# Patient Record
Sex: MALE | Race: WHITE | Employment: OTHER | URBAN - METROPOLITAN AREA
[De-identification: names, ages, dates, MRNs, and addresses within clinical notes are randomized per-mention and may not be internally consistent; named-entity substitution may affect disease eponyms.]

---

## 2021-05-10 ENCOUNTER — HOSPITAL ENCOUNTER (OUTPATIENT)
Age: 71
Setting detail: OUTPATIENT SURGERY
Discharge: HOME | End: 2021-05-10
Attending: INTERNAL MEDICINE | Admitting: INTERNAL MEDICINE
Payer: MEDICARE

## 2021-05-10 VITALS
OXYGEN SATURATION: 95 % | SYSTOLIC BLOOD PRESSURE: 134 MMHG | HEART RATE: 80 BPM | TEMPERATURE: 98.3 F | DIASTOLIC BLOOD PRESSURE: 81 MMHG | HEIGHT: 71 IN | RESPIRATION RATE: 15 BRPM | WEIGHT: 203 LBS | BODY MASS INDEX: 28.42 KG/M2

## 2021-05-10 DIAGNOSIS — I35.0 AORTIC VALVE STENOSIS, ETIOLOGY OF CARDIAC VALVE DISEASE UNSPECIFIED: ICD-10-CM

## 2021-05-10 PROCEDURE — C1751 CATH, INF, PER/CENT/MIDLINE: HCPCS | Performed by: INTERNAL MEDICINE

## 2021-05-10 PROCEDURE — C1887 CATHETER, GUIDING: HCPCS | Performed by: INTERNAL MEDICINE

## 2021-05-10 PROCEDURE — 93453 R&L HRT CATH W/VENTRICLGRPHY: CPT | Performed by: INTERNAL MEDICINE

## 2021-05-10 PROCEDURE — 99153 MOD SED SAME PHYS/QHP EA: CPT | Performed by: INTERNAL MEDICINE

## 2021-05-10 PROCEDURE — 77030019569 HC BND COMPR RAD TERU -B: Performed by: INTERNAL MEDICINE

## 2021-05-10 PROCEDURE — 77030012468 HC VLV BLEEDBK CNTRL ABBT -B: Performed by: INTERNAL MEDICINE

## 2021-05-10 PROCEDURE — 77030013744: Performed by: INTERNAL MEDICINE

## 2021-05-10 PROCEDURE — C1769 GUIDE WIRE: HCPCS | Performed by: INTERNAL MEDICINE

## 2021-05-10 PROCEDURE — 99152 MOD SED SAME PHYS/QHP 5/>YRS: CPT | Performed by: INTERNAL MEDICINE

## 2021-05-10 PROCEDURE — 74011250636 HC RX REV CODE- 250/636: Performed by: INTERNAL MEDICINE

## 2021-05-10 PROCEDURE — 74011000250 HC RX REV CODE- 250: Performed by: INTERNAL MEDICINE

## 2021-05-10 PROCEDURE — 74011000636 HC RX REV CODE- 636: Performed by: INTERNAL MEDICINE

## 2021-05-10 PROCEDURE — 77030008543 HC TBNG MON PRSS MRTM -A: Performed by: INTERNAL MEDICINE

## 2021-05-10 PROCEDURE — 77030004532 HC CATH ANGI DX IMP BSC -A: Performed by: INTERNAL MEDICINE

## 2021-05-10 PROCEDURE — 77030013797 HC KT TRNSDUC PRSSR EDWD -A: Performed by: INTERNAL MEDICINE

## 2021-05-10 RX ORDER — FENOFIBRIC ACID 135 MG/1
135 CAPSULE, DELAYED RELEASE ORAL DAILY
COMMUNITY

## 2021-05-10 RX ORDER — MIDAZOLAM HYDROCHLORIDE 1 MG/ML
INJECTION, SOLUTION INTRAMUSCULAR; INTRAVENOUS AS NEEDED
Status: DISCONTINUED | OUTPATIENT
Start: 2021-05-10 | End: 2021-05-10 | Stop reason: HOSPADM

## 2021-05-10 RX ORDER — HEPARIN SODIUM 1000 [USP'U]/ML
INJECTION, SOLUTION INTRAVENOUS; SUBCUTANEOUS AS NEEDED
Status: DISCONTINUED | OUTPATIENT
Start: 2021-05-10 | End: 2021-05-10 | Stop reason: HOSPADM

## 2021-05-10 RX ORDER — TADALAFIL 2.5 MG/1
2.5 TABLET ORAL DAILY
COMMUNITY

## 2021-05-10 RX ORDER — SODIUM CHLORIDE 9 MG/ML
3 INJECTION, SOLUTION INTRAVENOUS CONTINUOUS
Status: DISPENSED | OUTPATIENT
Start: 2021-05-10 | End: 2021-05-10

## 2021-05-10 RX ORDER — FENTANYL CITRATE 50 UG/ML
INJECTION, SOLUTION INTRAMUSCULAR; INTRAVENOUS AS NEEDED
Status: DISCONTINUED | OUTPATIENT
Start: 2021-05-10 | End: 2021-05-10 | Stop reason: HOSPADM

## 2021-05-10 RX ORDER — SODIUM CHLORIDE 9 MG/ML
1.5 INJECTION, SOLUTION INTRAVENOUS CONTINUOUS
Status: DISPENSED | OUTPATIENT
Start: 2021-05-10 | End: 2021-05-10

## 2021-05-10 RX ORDER — SIMVASTATIN 40 MG/1
40 TABLET, FILM COATED ORAL
COMMUNITY

## 2021-05-10 RX ORDER — VERAPAMIL HYDROCHLORIDE 2.5 MG/ML
INJECTION, SOLUTION INTRAVENOUS AS NEEDED
Status: DISCONTINUED | OUTPATIENT
Start: 2021-05-10 | End: 2021-05-10 | Stop reason: HOSPADM

## 2021-05-10 RX ORDER — ASPIRIN 81 MG/1
81 TABLET ORAL DAILY
Qty: 90 TAB | Refills: 4 | Status: SHIPPED | OUTPATIENT
Start: 2021-05-10

## 2021-05-10 RX ORDER — TESTOSTERONE 10 MG/G
2 GEL TOPICAL DAILY
COMMUNITY

## 2021-05-10 RX ORDER — FLUOXETINE 20 MG/1
20 TABLET ORAL DAILY
COMMUNITY

## 2021-05-10 RX ORDER — MIRTAZAPINE 45 MG/1
45 TABLET, FILM COATED ORAL
COMMUNITY

## 2021-05-10 RX ORDER — LIDOCAINE HYDROCHLORIDE 10 MG/ML
INJECTION INFILTRATION; PERINEURAL AS NEEDED
Status: DISCONTINUED | OUTPATIENT
Start: 2021-05-10 | End: 2021-05-10 | Stop reason: HOSPADM

## 2021-05-10 RX ORDER — TAMSULOSIN HYDROCHLORIDE 0.4 MG/1
0.4 CAPSULE ORAL DAILY
COMMUNITY
End: 2021-07-15

## 2021-05-10 RX ORDER — HYDRALAZINE HYDROCHLORIDE 20 MG/ML
INJECTION INTRAMUSCULAR; INTRAVENOUS AS NEEDED
Status: DISCONTINUED | OUTPATIENT
Start: 2021-05-10 | End: 2021-05-10 | Stop reason: HOSPADM

## 2021-05-10 RX ADMIN — SODIUM CHLORIDE 3 ML/KG/HR: 9 INJECTION, SOLUTION INTRAVENOUS at 11:14

## 2021-05-10 NOTE — DISCHARGE INSTRUCTIONS
**Your aortic valve is severely narrowed (severe aortic stenosis). We will evaluate you for TAVR (Transcatheter Aortic Valve Replacement). **You only have mild/moderate blockages in your heart arteries that can be treated with medications. Stents or bypass surgery are not necessary. **Start taking aspirin 81 mg daily -- this will help prevent further heart artery blockages from forming. **Dr. Cyndy Dupont office will contact you to schedule the following:  - TAVR CT scan  - Carotid ultrasound  - Visit with a cardiothoracic surgeon, Dr. Familia Masterson  Current Discharge Medication List      START taking these medications    Details   aspirin delayed-release 81 mg tablet Take 1 Tab by mouth daily. Qty: 90 Tab, Refills: 4  Start date: 5/10/2021         CONTINUE these medications which have NOT CHANGED    Details   tadalafiL (Cialis) 2.5 mg tablet Take 2.5 mg by mouth daily. fenofibric acid (TRILIPIX ER) 135 mg capsule Take 135 mg by mouth daily. FLUoxetine (PROzac) 20 mg tablet Take 20 mg by mouth daily. mirtazapine (REMERON) 45 mg tablet Take 45 mg by mouth nightly. simvastatin (ZOCOR) 40 mg tablet Take 40 mg by mouth nightly. tamsulosin (FLOMAX) 0.4 mg capsule Take 0.4 mg by mouth daily. testosterone 12.5 mg/ 1.25 gram (1 %) glpm 2 Pump(s) by TransDERmal route daily. After Your Cardiac Catheterization    Cardiac catheterization (also called cardiac cath) is an invasive imaging procedure that allows your doctor to look at your coronary arteries to diagnose coronary artery disease. It can also be used to measure pressures in your chambers, and evaluate the function of your heart. Instructions for going home after Cardiac Catheterization    Care for the Catheter Insertion Site  Procedures may be performed in the femoral artery in the groin (in the area at the top of your thigh) or in the radial artery in your arm.  When you go home, there will be a bandage (dressing) over the catheter insertion site (also called the wound site).  The morning after your procedure, you may take the dressing off. The easiest way to do this is when you are showering, get the tape and dressing wet and remove it.  After the bandage is removed, cover the area with a small adhesive bandage. It is normal for the catheter insertion site to be black and blue for a couple of days. The site may also be slightly swollen and pink, and there may be a small lump (about the size of a quarter) at the site.  Wash the catheter insertion site at least once daily with soap and water. Place soapy water on your hand or washcloth and gently wash the insertion site; do not rub.  Keep the area clean and dry when you are not showering.  Do not use powders, creams, lotions or ointment on the wound site.  Wear loose clothes and loose underwear.  Do not take a bath, tub soak, go in a Jacuzzi, or swim in a pool or lake for one week after the procedure.  You may notice a small lump at the site. This is normal and may last up to 6 weeks. CHECK THE CATHETER INSERTION SITE DAILY:    If bleeding at the cath site occurs, take a clean washcloth and apply direct pressure just above the puncture site for at least 15 minutes. Call 911 immediately if the bleeding is not controlled. Continue to apply direct pressure until an ambulance gets to your location. Do not try to drive yourself or have someone else drive you to the hospital.  Have the ambulance bring you to the emergency room. Activity Guidelines  Your doctor will tell you when you can resume activities. In general, you will need to take it easy for the first two days after you get home. You can expect to feel tired and weak the day after the procedure. Take walks around your house and plan to rest during the day.     For femoral cardiac cath   Do not strain during bowel movements for the first 3 to 4 days after the procedure to prevent bleeding from the catheter insertion site.  Avoid heavy lifting (more than 10 pounds) and pushing or pulling heavy objects for the first 5 to 7 days after the procedure.  Do not participate in strenuous activities for 5 days after the procedure. This includes most sports - jogging, golfing, play tennis, and bowling.  You may climb stairs if needed, but walk up and down the stairs more slowly than usual.    Gradually increase your activities until you reach your normal activity level within one week after the procedure. For radial cardiac cath   Do not participate in strenuous activities for 2 days after the procedure. This includes most sports - jogging, golfing, play tennis, and bowling.  Gradually increase your activities until you reach your normal activity level within two days after the procedure. Ask your doctor when it is safe to   Return to work.  Resume sexual activity.  Resume driving. Most people are able to resume driving within 24 hours after going home. Medications   Please review your medications with your doctor before you go home. Ask your doctor if you should continue taking the medications you were taking before the procedure.  If you have diabetes, your doctor may adjust your diabetes medications for one to two days after your procedure. Please be sure to ask for specific directions about taking your diabetes medication after the procedure.  Depending on the results of your procedure, your doctor may prescribe new medication. Please make sure you understand what medications you should be taking after the procedure and how often to take them.  You may use Tylenol 325mg 1-2 tablets every 6 hours as needed for pain or discomfort, unless otherwise instructed. If you have significant         discomfort more than 48 hours after your procedure, please call your physicians office.    If you take METFORMIN, do NOT take this for the next 2 days after your procedure. Fluid Guidelines  Be sure to drink eight to ten glasses of clear fluids (water is preferred) for the 24 hours to flush the contrast material from your system. Importance of a Heart-Healthy Lifestyle  It is important for you to be committed to leading a heart-healthy lifestyle. Your health care team can help you achieve your goals, but it is up to you to take your medications as prescribed, make dietary changes, quit smoking, exercise regularly, keep your follow-up appointments and be an active member of the treatment team.    Follow Up  Please call your cardiologist to schedule a follow-up appointment in the next 1-2 weeks if possible. Ask your primary care doctor when you should return for follow-up. Please ask your doctor if you have any questions about cardiac catheterization, angioplasty or stenting. If possible, have someone stay with you for the first night. It is normal to feel tired for the first couple of days. Take it easy and follow your physicians instructions on activity. CALL THE PHYSICIAN:  ? If the site becomes red, swollen, or feels warm to the touch, or is healing poorly    ? If you note any large/extending bruise, fever >101.0 or chills  ? If your extremity has numbness, tingling, discoloration, abnormal swelling, tightness or pain   ? If you have difficulty with urination or develop nausea, vomiting, or severe abdominal pain    SIGNS AND SYMPTOMS:  ? Notify your physician for new or recurrent symptoms of chest discomfort, unusual shortness of breath or fatigue. These could be signs of a problem and should be discussed with your physician. ? For significant chest pain or symptoms of angina not relieved with rest:  if you have been prescribed Nitroglycerin, take as directed (taken under the tongue, one at a time 5 minutes apart for a total of 3 doses, sitting down). If the discomfort is not relieved after the 3rd Nitroglycerin, call 911.   If you have not been prescribed Nitroglycerin and your chest discomfort is significant, call 911. Take the ambulance, do not try to drive yourself or have someone else drive you to the hospital.     AFTER CARE:  ? Follow up with your physician as instructed  ? Follow a heart healthy diet with proper portion control, daily stress management, daily exercise, blood pressure and cholesterol control, and smoking cessation. The success of your stent, if you had one placed, and the prevention of future catheterizations heavily depends on your lifestyle changes you make now! ? You may start walking short distances the day of your procedure. Gradually increase as tolerated each day. Current activity recommendations are 30 minutes of exercise at least 5 days a week. Work up to this as you recover. Walk, ride a bike, or choose any other activity you enjoy to reach this activity goal.   ? Avoid walking outside in extremes of heat or cold. Walk inside (at home, at the mall, or at a large store) when it is cold and windy or hot and humid. ? If you had a stent placed, consider [facility] as a resource to help you make the needed lifestyle changes to live a heart healthy lifestyle. Discuss your candidacy with your physician. If you have questions, call your physicians office or the Cardiac Cath Lab at [phone]. The Cath Lab is operational from 6:30 a.m. to 5:00 p.m., Monday through Friday. After hours, notify your physician. [address] [place] [address] can be reached at [phone]. [facility] is operational Monday-Thursday 8:30 a.m. to 5:00 p.m. and Friday 8:30 a.m. to 12:00 p.m.     Remember:  IN CASE OF BLEEDING: KEEP FIRM PRESSURE ON THE PROCEDURE SITE AND CALL 911 IF NOT CONTROLLED

## 2021-05-10 NOTE — PROGRESS NOTES
1623:  I have reviewed discharge instructions with the patient. The patient verbalized understanding. 1709: Manoj Guajardo ambulated @ 2876 (time) approximately 20 feet. Patient tolerated ambulation without adverse advents. left radial (right/left, groin/arm)  without bleeding or hematoma noted. 1730:  Patient wheeled out via wheelchair for discharge to the patient's wife.

## 2021-05-10 NOTE — H&P
Outpatient Cath Lab History and Physical     5/10/2021  Patient: Guero Gibbs 79 y.o. male   Chief Complaint: []   Angina   [x]   Dyspnea   []       History of Present Illness: (for details see office notes)  78 yo M with borderline severe AS with progressive GALINDO. Nearly severe by Vmax and MG, but moderate/severe by CAROL. TTE 3/3/21:  EF 55-60%, grade 1 DD, CAROL 1.21 cm2, AV MG 38 mmHg, Vmax 3.9 m/s, DI 0.27, LVOT/AV VTI 22.1/782.4 cm, LVOTd 2.4 cm. Due to progression in GALINDO, now NYHA class II, here for invasive aortic valve study to better assess the aortic valve. Occasional LH, no syncope. No CP. History:(for details see office notes)  Aortic stenosis  HTN  PAD  HL  DM2    Review of Systems  Except as noted in HPI, patient denies recent fever or chills, nausea, vomiting, diarrhea, hemoptysis, hematemesis, dysuria, myalgias, focal neurologic symptoms, ecchymosis, angioedema, odynophagia, dysphagia, sore throat, earache,rash, melena, hematochezia, depression, GERD, cold intolerance, petechia, bleeding gums, or significant weight loss. A comprehensive review of systems was negative except for that written in the HPI. No family history on file. (see office notes for details)  Social History     Tobacco Use    Smoking status: Not on file   Substance Use Topics    Alcohol use: Not on file   (see office notes for details)    Allergies: Allergies   Allergen Reactions    Ceftin [Cefuroxime Axetil] Hives     Hives and vomiting    Ciprofloxacin Other (comments)     Light headedness       Prior to Admission Medications (details in office records):  Prior to Admission medications    Medication Sig Start Date End Date Taking? Authorizing Provider   tadalafiL (Cialis) 2.5 mg tablet Take 2.5 mg by mouth daily. Yes Provider, Historical   fenofibric acid (TRILIPIX ER) 135 mg capsule Take 135 mg by mouth daily. Yes Provider, Historical   FLUoxetine (PROzac) 20 mg tablet Take 20 mg by mouth daily. Yes Provider, Historical   mirtazapine (REMERON) 45 mg tablet Take 45 mg by mouth nightly. Yes Provider, Historical   simvastatin (ZOCOR) 40 mg tablet Take 40 mg by mouth nightly. Yes Provider, Historical   tamsulosin (FLOMAX) 0.4 mg capsule Take 0.4 mg by mouth daily. Yes Provider, Historical   testosterone 12.5 mg/ 1.25 gram (1 %) glpm 2 Pump(s) by TransDERmal route daily. Yes Provider, Historical         Physical Exam:  Overall VSSAF;    Visit Vitals  BP (!) 152/91 (BP 1 Location: Left arm, BP Patient Position: At rest)   Pulse 97   Temp 98.3 °F (36.8 °C)   Resp 12   Ht 5' 11\" (1.803 m)   Wt 92.1 kg (203 lb)   SpO2 96%   BMI 28.31 kg/m²     Temp (24hrs), Av.3 °F (36.8 °C), Min:98.3 °F (36.8 °C), Max:98.3 °F (36.8 °C)      Physical Exam  GEN: NAD, appears stated age  HEENT: EOMI, MMM, OP clear  NECK: Normal JVP  CV: RRR, normal S1, soft S2, harsh III/VI late-peaking systolic murmur at LUSB, no rubs or gallops  LUNGS: CTAB, no W/R/R  ABD: NABS, soft, NT/ND  EXT: No edema, 2+ and symmetrical radial pulses and pedal pulses b/l  PSYCH: Mood and affect normal  NEURO: AAO, MAEW, face symmetrical, speech intact    Labs: per outpatient records  CXR: per outpatient records    Plan of Care/Planned Procedure:  Risks, benefits, and alternatives reviewed with patient and he agrees to proceed with the procedure. For other plans, see orders. L/RHC/coronaries/AV study in setting of progressive GALINDO c/f symptomatic, severe aortic stenosis. Attila Reyes, [address] [place] [address] / [address] [place] [address] [facility]  05/10/21

## 2021-05-10 NOTE — PROGRESS NOTES
Cardiac Cath Lab Procedure Area Arrival Note:    Vazquez Edmondson arrived to Cardiac Cath Lab, Procedure Area. Patient identifiers verified with NAME and DATE OF BIRTH. Procedure verified with patient. Consent forms verified. Allergies verified. Patient informed of procedure and plan of care. Questions answered with review. Patient voiced understanding of procedure and plan of care. Patient on cardiac monitor, non-invasive blood pressure, SPO2 monitor. On room air. IV of NS on pump at 138 ml/hr. Patient status doing well without problems. Patient is A&Ox 4. Patient reports no chest pain or shortness of breath. Patient medicated during procedure with orders obtained and verified by Dr. Mirtha Acevedo. Refer to patients Cardiac Cath Lab PROCEDURE REPORT for vital signs, assessment, status, and response during procedure, printed at end of case. Printed report on chart or scanned into chart.

## 2021-05-10 NOTE — PROGRESS NOTES
Transfer to [address] [place] [address] from Procedure Area    Verbal report given to Kati Quiroz on Zaira Wagner being transferred to Cardiac Cath Lab  for routine progression of care   Patient is post left and right heart cath procedure. Patient stable upon transfer to . Report consisted of patients Situation, Background, Assessment and   Recommendations(SBAR). Information from the following report(s) SBAR, Procedure Summary and MAR was reviewed with the receiving nurse. Opportunity for questions and clarification was provided. Patient medicated during procedure with orders obtained and verified by Dr. Muñoz. Refer to patient PROCEDURE REPORT for vital signs, assessment, status, and response during procedure.

## 2021-05-10 NOTE — ROUTINE PROCESS
Cardiac Cath Lab Recovery Arrival Note:      Macarena Montalvo arrived to Cardiac Cath Lab, Recovery Area. Staff introduced to patient. Patient identifiers verified with NAME and DATE OF BIRTH. Procedure verified with patient. Consent forms reviewed and signed by patient or authorized representative and verified. Allergies verified. Patient informed of procedure and plan of care. Questions answered with review. Patient prepped for procedure, per orders from physician, prior to arrival.    Patient on cardiac monitor, non-invasive blood pressure, SPO2 monitor. Patient is A&Ox 4. Patient reports no complaints. Patient in stretcher, in low position, with side rails up, call bell within reach, patient instructed to call of assistance as needed. Patient prep in: [facility] Recovery Area, Bed# 7. Family in: waiting room. Prep by: OTSHA Shannon

## 2021-05-10 NOTE — Clinical Note
Multiple views of the left main, left coronary artery and circumflex artery obtained using power injection.

## 2021-05-10 NOTE — PROGRESS NOTES
TRANSFER - IN REPORT:    Verbal report received from [place] on Katrin Kathleen , from the Cardiac Cath lab, for routine progression of care. Report consisted of patients Situation, Background, Assessment and Recommendations(SBAR). Information from the following report(s) Procedure Summary, Intake/Output, MAR and Recent Results was reviewed with the receiving clinician. Opportunity for questions and clarification was provided. Assessment completed upon patients arrival to [address] [place] [address] and care assumed. Cardiac Cath Lab Recovery Arrival Note:     Katrin Kathleen arrived to [facility] recovery area. Patient procedure= R/LHC. Patient on cardiac monitor, non-invasive blood pressure, Patient status doing well without problems. Patient is A&Ox 4. Patient reports no complaints. Procedure site without any bleeding and no hematoma.

## 2021-06-23 ENCOUNTER — OFFICE VISIT (OUTPATIENT)
Dept: CARDIOLOGY CLINIC | Age: 71
End: 2021-06-23
Payer: MEDICARE

## 2021-06-23 VITALS
OXYGEN SATURATION: 94 % | HEART RATE: 66 BPM | RESPIRATION RATE: 16 BRPM | DIASTOLIC BLOOD PRESSURE: 72 MMHG | SYSTOLIC BLOOD PRESSURE: 140 MMHG

## 2021-06-23 DIAGNOSIS — I35.0 NONRHEUMATIC AORTIC VALVE STENOSIS: Primary | ICD-10-CM

## 2021-06-23 PROBLEM — E11.59 CONTROLLED TYPE 2 DIABETES MELLITUS WITH CIRCULATORY DISORDER, WITHOUT LONG-TERM CURRENT USE OF INSULIN (HCC): Status: ACTIVE | Noted: 2021-06-23

## 2021-06-23 PROBLEM — N40.0 BPH (BENIGN PROSTATIC HYPERPLASIA): Status: ACTIVE | Noted: 2021-06-23

## 2021-06-23 PROBLEM — F32.A DEPRESSION: Status: ACTIVE | Noted: 2021-06-23

## 2021-06-23 PROBLEM — N18.30 STAGE 3 CHRONIC KIDNEY DISEASE (HCC): Status: ACTIVE | Noted: 2021-06-23

## 2021-06-23 PROBLEM — E78.5 DYSLIPIDEMIA: Status: ACTIVE | Noted: 2021-06-23

## 2021-06-23 PROCEDURE — G8432 DEP SCR NOT DOC, RNG: HCPCS | Performed by: THORACIC SURGERY (CARDIOTHORACIC VASCULAR SURGERY)

## 2021-06-23 PROCEDURE — G8419 CALC BMI OUT NRM PARAM NOF/U: HCPCS | Performed by: THORACIC SURGERY (CARDIOTHORACIC VASCULAR SURGERY)

## 2021-06-23 PROCEDURE — 1101F PT FALLS ASSESS-DOCD LE1/YR: CPT | Performed by: THORACIC SURGERY (CARDIOTHORACIC VASCULAR SURGERY)

## 2021-06-23 PROCEDURE — G8427 DOCREV CUR MEDS BY ELIG CLIN: HCPCS | Performed by: THORACIC SURGERY (CARDIOTHORACIC VASCULAR SURGERY)

## 2021-06-23 PROCEDURE — 99205 OFFICE O/P NEW HI 60 MIN: CPT | Performed by: THORACIC SURGERY (CARDIOTHORACIC VASCULAR SURGERY)

## 2021-06-23 PROCEDURE — 3017F COLORECTAL CA SCREEN DOC REV: CPT | Performed by: THORACIC SURGERY (CARDIOTHORACIC VASCULAR SURGERY)

## 2021-06-23 PROCEDURE — G8536 NO DOC ELDER MAL SCRN: HCPCS | Performed by: THORACIC SURGERY (CARDIOTHORACIC VASCULAR SURGERY)

## 2021-06-23 RX ORDER — ALFUZOSIN HYDROCHLORIDE 10 MG/1
10 TABLET, EXTENDED RELEASE ORAL DAILY
COMMUNITY
Start: 2021-07-14 | End: 2021-07-16

## 2021-06-23 NOTE — PROGRESS NOTES
Patient: Rosa Moore   Age: 79 y.o. Patient Care Team:  Lydia Lawrence MD as PCP - General (Family Medicine)  Govind West MD (Cardiology)  Antoinette Winston MD (Cardiothoracic Surgery)    PCP: Lydia Lawrence MD    Cardiologist: Dr. Ty Hung    Diagnosis/Reason for Consultation: The encounter diagnosis was Nonrheumatic aortic valve stenosis. 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 male with PMHx of AS, DM, Dyslipidemia, CKD III, Depression, that is referred to the [address] [place] [address] by Dr. Ty Hung for interventional evaluation of  Aortic Stenosis. Really started experiencing symptoms about a year ago. He tried to run on [place] (he has been able to do this for short distances) and could not do it. He was significantly winded and fatigued. He has had multiple knee and shoulder surgeries which do impact his activities some but he is still able to complete his 3 mile walk daily. He is able go up about 20 stairs before getting winded. Occasionally notes palpitations. No real dizziness, no syncope or recent falls. Denies orthopnea, PND, LE edema, GIB, or HF hospitalizations in the last year. His Flomax was recently changed to Alfuzosin. Chest tightness which radiates to his left arm. Symptoms occur with activity and rest. Pain can last a couple of hours. He rates this between a 6-9. Associated diaphoresis-mostly at night. Denies associated N/V, dizziness, syncope. Has gained about 5 lbs recently.     SOB/GALINDO: Yes   Fatigue: Yes   Palpitations: Yes   Chest pain: Yes   Chest tightness with activity: Yes   Lightheadedness: No:    Syncope: No:    Falls: No:    Orthopnea: No:    PND: No:    LE edema: No:    Medication changes in past 3 months: Yes (Flomax changed to Alfuzosin)  Blood/Blackness/Tarriness of stools: No:    Heart Failure Admission w/in past year:  No:    Heart Failure Admission w/in past 2 weeks: No:     NYHA Classification: IIB   Class I (Mild): No limitation of physical activity. Ordinary physical activity does not cause undue fatigue, palpitation, or dyspnea. Class II (Mild): Slight limitation of physical activity. Comfortable at rest, but ordinary physical activity results in fatigue, palpitation, or dyspnea. Class III (Moderate): Marked limitation of physical activity. Comfortable at rest, but less than ordinary activity causes fatigue, palpitation, or dyspnea   Class IV (Severe): Unable to carry out any physical activity without discomfort. Symptoms  of cardiac insufficiency at rest.  If any physical activity is undertaken, discomfort is increased. Angina Classification: 2   Class 0: No symptoms   Class 1: Angina with strenuous exercise   Class 2: Angina with moderate exercise   Class 3: Angina with mild exertion   Walking 1-2 level blocks at normal pace; Climbing 1 flight of stairs at normal pace  Class 4: Angina at any level of physical exertion       Past Medical History:   Diagnosis Date    Aortic stenosis     Diabetes ([address]     Diastolic heart failure (HCC)     Dyslipidemia     PVD (peripheral vascular disease) (HCC)      Endocarditis: No:    Pulmonary HTN:  No:  Greater than 2/3 systemic: No:   Immunocompromised/Steroids: No:   Liver Dz/Cirrhosis: No:    If yes, MELD score:  n/a  Last Dental Visit:  8 months ago   Any dental pain/concerns: None    Past Surgical History:   Procedure Laterality Date    HX KNEE ARTHROSCOPY      HX SHOULDER ARTHROSCOPY        Social History     Tobacco Use    Smoking status: Former Smoker    Smokeless tobacco: Never Used   Substance Use Topics    Alcohol use:  Yes     Alcohol/week: 1.0 standard drinks     Types: 1 Standard drinks or equivalent per week      Family History   Problem Relation Age of Onset    Dementia Mother     Alzheimer Father     Heart Attack Brother      Prior to Admission medications    Medication Sig Start Date End Date Taking? Authorizing Provider   alfuzosin SR (UROXATRAL) 10 mg SR tablet Take 10 mg by mouth daily. Yes Provider, Historical   tadalafiL (Cialis) 2.5 mg tablet Take 2.5 mg by mouth daily. Yes Provider, Historical   fenofibric acid (TRILIPIX ER) 135 mg capsule Take 135 mg by mouth daily. Yes Provider, Historical   FLUoxetine (PROzac) 20 mg tablet Take 20 mg by mouth daily. Yes Provider, Historical   mirtazapine (REMERON) 45 mg tablet Take 45 mg by mouth nightly. Yes Provider, Historical   simvastatin (ZOCOR) 40 mg tablet Take 40 mg by mouth nightly. Yes Provider, Historical   testosterone 12.5 mg/ 1.25 gram (1 %) glpm 2 Pump(s) by TransDERmal route daily. Yes Provider, Historical   aspirin delayed-release 81 mg tablet Take 1 Tab by mouth daily. 5/10/21  Yes Perez Mendoza MD   [facility]) 0.4 mg capsule Take 0.4 mg by mouth daily. Patient not taking: Reported on 6/23/2021    Provider, Historical       Allergies   Allergen Reactions    Ceftin [Cefuroxime Axetil] Hives     Hives and vomiting    Ciprofloxacin Other (comments)     Light headedness       Current Medications:   Current Outpatient Medications   Medication Sig Dispense Refill    alfuzosin SR (UROXATRAL) 10 mg SR tablet Take 10 mg by mouth daily.  tadalafiL (Cialis) 2.5 mg tablet Take 2.5 mg by mouth daily.  fenofibric acid (TRILIPIX ER) 135 mg capsule Take 135 mg by mouth daily.  FLUoxetine (PROzac) 20 mg tablet Take 20 mg by mouth daily.  mirtazapine (REMERON) 45 mg tablet Take 45 mg by mouth nightly.  simvastatin (ZOCOR) 40 mg tablet Take 40 mg by mouth nightly.  testosterone 12.5 mg/ 1.25 gram (1 %) glpm 2 Pump(s) by TransDERmal route daily.       aspirin delayed-release 81 mg tablet Take 1 Tab by mouth daily. 90 Tab 4    tamsulosin (FLOMAX) 0.4 mg capsule Take 0.4 mg by mouth daily. (Patient not taking: Reported on 2021)         Vitals: Blood pressure (!) 140/72, pulse 66, resp. rate 16, SpO2 94 %. Allergies: is allergic to ceftin [cefuroxime axetil] and ciprofloxacin. Review of Systems: Pertinent Positives per HPI   [] Unable to obtain  ROS due to  []mental status change  []sedated   []intubated   [x]Total of 13 systems reviewed as follows:  Constitutional: Negative fever, negative chills, +fatigue  Eyes:   Negative for amauroses fugax, +glasses  ENT:   Negative sore throat,oral abscess   Endocrine Negative for thyroid replacement Rx; goiter; DM  Respiratory:  Negative chronic cough,sputum production, +shortness of breath  Cards:   Negative for varicosities, claudication, lower extremity edema, +Chest pain, +arm pain, +palpitations  GI:   Negative for dysphagia, bleeding, nausea, vomiting, diarrhea, and abdominal pain, +GERD  Genitourinary: Negative for dysuria, +BPH, +frequency  Integument:  Negative for rash and pruritus  Hematologic:  Negative for bleeding dyscrasia, +easy bruising  Musculoskel: Negative for muscle weakness inhibiting ambulation  Neurological:  Negative for stroke, TIA, syncope, dizziness  Behavl/Psych: Negative for feelings of anxiety, depression     Cardiovascular Testing:     EK21 rhythm strip SR.  Needs updated EKG    TTE:  3/3/21 Done at [facility] and scanned in  CAROL: 1.2 cm2  M mmHg  P mmHg  PV: 3.9 m/s      Cardiac catheterization: 5/10/21  Conclusion       · Catheters used: 5 Fr JL3.5, JR4 diagnostic catheters for coronary angiography; 5 Fr AL1 to cross the aortic valve and 6 Fr JR4 guide with SH and 4 Fr angled glide catheter for simultaneous aortic and LV pressure measurement  · Heparin was used for procedural anticoagulation  · Uncomplicated ultrasound guided access of the right IJ vein and left radial artery  · Successful hemostasis of the 6 Fr slender right IJ vein access site using manual compression and the 6 Fr slender LRA access site using a TR band     1. Mildly elevated right-sided filling pressures (RA 17/11/10 mmHg) with normal left-sided filling pressures (PCWP 17/14/13 mmHg).     2. Normal pulmonary artery pressures (PA 32/13/21 mmHg) with normal PVR (1.18 Gallo), TPG 8 mmHg.     3. Normal cardiac output (Flako 6.76 L/min) and cardiac index (Flako 3.19 L/min/m2).     4. Normal SVR (1124 dynes/sec/cm-5).    5. Symptomatic severe calcific aortic stenosis, CAROL 0.99 cm2, AV MG 51.75 mmHg.     6. Moderate 1-vessel CAD with a 50% proximal LAD and 50-60% mid LAD stenosis in a right dominant coronary circulation.     7. Evaluate for aortic valve replacement.     8. Results discussed with the patient and the patient's wife.           Complications    Complications documented before study signed (5/10/2021  0:56 PM)     No complications were associated with this study. Documented by Kelsea Miller MD - 5/10/2021  4:12 PM      Coronary Findings    Diagnostic  Dominance: Right  Left Main   The vessel was visualized by angiography. The vessel is angiographically normal.   Left Anterior Descending   Prox LAD lesion, 50% stenosed. Prox LAD to Mid LAD lesion, 50% stenosed. Mid LAD lesion, 55% stenosed. Left Circumflex   The vessel was visualized by angiography. The vessel exhibits minimal luminal irregularities. Right Coronary Artery   The vessel was visualized by angiography. The vessel exhibits minimal luminal irregularities. Intervention    No interventions have been documented. Left Heart    Left Ventricle LV EDP is: 18. Right Heart    Right Heart Cath [place] catheter inserted via right internal jugular vein. No pulmonary hypertension noted. Severe aortic stenosis was noted.    Vitals - VO2 Value: 264.95 ml/min  Hb: 15.9 %   Blood Oximetry Information - AV Hb PV: 0 gm/dL   Pressure Phase - Phase: Phase: Baseline   AO Pressures - AO Systolic: 448 mmHg  AO Diastolic: 81 mmHg  AO Mean: 101 mmHg  Heart Rate: 73 bpm   PA Pressures - PA Systolic: 32 mmHg  PA Diastolic: 13 mmHg  PA Mean: 21 mmHg   RV Pressures - RV Systolic: 41 mmHg  RV End Diastolic: 14 mmHg  RV dP/dt: 384 mmHg/sec   LV Pressures - LV Systolic: 928 mmHg  LV End Diastolic: 32 mmHg  LV dP/dt: 3072 mmHg/sec   RA Pressures - RA Wedge A Wave: 17 mmHg  RA Wedge V Wave: 11 mmHg  RA Wedge Mean: 10 mmHg   PCW Pressures - PCW A Wave: 17 mmHg PCW V Wave: 14 mmHg  PCW Mean: 12 mmHg   Atrial Wedge Pressures - Source: Measured  RA Atrial Wedge Heart Rate: 75 bpm   Cath Pressure - FA End Diastolic Cath Pressure: 74 mmHg  PCW Source: Measured  PCW Heart Rate: 78 bpm   Cardiac Output Results - Flako C.O.: 6.76 L/min  Flako C. I.: 3.19 L/min/m2  QPI: 3.19 L/min/m2  QSI Value: 3.19 L/min/m2   Resistance Results - PVR: 106.43 dsc-5  PVR-I: 225.59 dsc-5/m2  SVR: 1076.16 dsc-5  SVR-I: 2281.01 dsc-5/m2  PVR/SVR: 0.1  TPR: 248.35 dsc-5  TPR-I: 526.39 dsc-5/m2  TVR: 1194.42 dsc-5  TVR-I: 2531.67 dsc-5/m2  TPR/TVR: 0.21   Aortic Valve Results - AV SEP: 21.54 sec/beat  AV Area: 0.99 cm2  AV Flow: 313.95 cc  AV Index: 0.46 cm2/m2  AV Gradient: 51.75 mmHg   Stroke Work Results - LVSW: 112.14 gm*m  LVSW-I: 52.91 gm*m/m2  RVSW: 12.81 gm*m  RVSW-I: 6.04 gm*m/m2   Valve Results - AV SEP: 21.54 sec/beat  AV Flow: 313.95 cc  AV Area: 0.99 cm2  AV Index: 0.46 cm2/m2  Aortic Valve HR: 79 bpm  AV Gradient: 51.75 mmHg  TPR/TVR: 0.21  PVR/SVR: 0.1         Carotid Dopplers: 06/25/21 Done at [facility]-scanned in  CHINO: 0%  LICA: 0-17%    PFTs: FEV1/Predicted:  None  Normal FEV1 > 1 Liter, Predicted = 100%, DLCO > 80%    Mild Lung Disease (60-70% Predicted)    Moderate Lung Disease (50-55% Predicted)    Severe Lung Disease (< 50% Predicted or PO2 < 60 or pCO2>50 on RA)    Gated C/A/P CTA: 06/25/21 Done at [facility]-report pending    Physical Exam:  General: Well nourished well groomed male appearing stated age  Neuro: A&OX3. SAUCEDA. PERRL. Steady unassisted gait  Head:Normocephalic. Atraumatic. Symmetrical  Neck: Trachea Midline  Resp: CTA B. No Adv BS/cough/sputum/tachypnea with seated conversation  CV: S1S2 RRR. ANÍBAL III/VI. No JVD/carotid bruits. Pink/warm/dry extremities. No LE peripheral edema  GI:Benign ab. Soft. NT/ND. Active BS  : Voids  Integ: No obvious s/s of infection or breakdown  Musculo/Skeletal: FROM in all major joints. Good muscle tone    Clinic Evaluation:   KCCQ-12: scanned into EMR    5 meter gait: 4.6 seconds    Frality Survey:  Derek Nails Index ADL - 6/6  scanned into EMR     STS 4.2 Risk Score / Predicted 30 day mortality: - calculations scanned into EMR  AVR  Risk of Mortality: 0.643%  Morbidity or Mortality: 6.687%    AVR/CABG:  Risk of Mortality: 0.929%  Morbidity or Mortality: 9.139%    Assessment/Plan:     1. Aortic Stenosis-severe and symptomatic. Awaiting CTA results then can make a decision on how to best proceed. The patient is agreeable to proceed with TAVR if anatomy is amenable. 2. DM II: Diet controlled. Followed by PCP. Will get updated A1C at [facility]. 3. Moderate 1-vessel CAD with a 50% proximal LAD and 50-60% mid LAD stenosis in a right dominant coronary circulation. On ASA, Statin. Not historically on BB    4. Dyslipidemia: On Simvastatin and Tricor    5. CKD III: Last Creatinine of 1.29 with GFR of 56. Avoid hypotension and nephrotoxic agents. 6. Depression: On Prozac    7.  BPH: On Alfuzosin      Pt seen and examined  Cath and echo reviewed  History taken and {PMHx reviewed and confirmed  ROS as documented and confirmed in pt evaluation  He has severe AS min CAD  Discussed risks and indications for TF TAVR CTA pending

## 2021-06-25 ENCOUNTER — TELEPHONE (OUTPATIENT)
Dept: CARDIOLOGY CLINIC | Age: 71
End: 2021-06-25

## 2021-06-30 ENCOUNTER — TELEPHONE (OUTPATIENT)
Dept: CARDIOLOGY CLINIC | Age: 71
End: 2021-06-30

## 2021-06-30 NOTE — TELEPHONE ENCOUNTER
Pt has called his Dr. Chrystal Carrera office several times and need a surgery date  ASAP. He told Dr. Cyndy Dupont office it has been over a week and wanted to gets schedule asap.

## 2021-07-02 NOTE — PERIOP NOTES
PT STATES FULLY VACCINATED W/PFIZER ON 2/22/21 AND 3/20/21. INSTRUCTED TO BRING VACCINATION CARD DOS AS PROOF.

## 2021-07-12 ENCOUNTER — HOSPITAL ENCOUNTER (OUTPATIENT)
Dept: PREADMISSION TESTING | Age: 71
Discharge: HOME | DRG: 267 | End: 2021-07-12
Payer: MEDICARE

## 2021-07-12 ENCOUNTER — HOSPITAL ENCOUNTER (OUTPATIENT)
Dept: GENERAL RADIOLOGY | Age: 71
Discharge: HOME | DRG: 267 | End: 2021-07-12
Attending: NURSE PRACTITIONER
Payer: MEDICARE

## 2021-07-12 VITALS
RESPIRATION RATE: 18 BRPM | SYSTOLIC BLOOD PRESSURE: 164 MMHG | WEIGHT: 217.3 LBS | BODY MASS INDEX: 30.42 KG/M2 | HEIGHT: 71 IN | HEART RATE: 80 BPM | OXYGEN SATURATION: 94 % | DIASTOLIC BLOOD PRESSURE: 104 MMHG | TEMPERATURE: 98 F

## 2021-07-12 LAB
ALBUMIN SERPL-MCNC: 3.6 G/DL (ref 3.5–5)
ALBUMIN/GLOB SERPL: 0.9 {RATIO} (ref 1.1–2.2)
ALP SERPL-CCNC: 36 U/L (ref 45–117)
ALT SERPL-CCNC: 44 U/L (ref 12–78)
ANION GAP SERPL CALC-SCNC: 5 MMOL/L (ref 5–15)
APPEARANCE UR: CLEAR
APTT PPP: 26.3 SEC (ref 22.1–31)
ARTERIAL PATENCY WRIST A: POSITIVE
AST SERPL-CCNC: 35 U/L (ref 15–37)
BACTERIA URNS QL MICRO: NEGATIVE /HPF
BASE EXCESS BLD CALC-SCNC: 2 MMOL/L
BASOPHILS # BLD: 0.1 K/UL (ref 0–0.1)
BASOPHILS NFR BLD: 1 % (ref 0–1)
BDY SITE: ABNORMAL
BILIRUB SERPL-MCNC: 0.7 MG/DL (ref 0.2–1)
BILIRUB UR QL: NEGATIVE
BNP SERPL-MCNC: 40 PG/ML
BUN SERPL-MCNC: 16 MG/DL (ref 6–20)
BUN/CREAT SERPL: 14 (ref 12–20)
CALCIUM SERPL-MCNC: 9.3 MG/DL (ref 8.5–10.1)
CHLORIDE SERPL-SCNC: 106 MMOL/L (ref 97–108)
CO2 SERPL-SCNC: 27 MMOL/L (ref 21–32)
COLOR UR: ABNORMAL
CREAT SERPL-MCNC: 1.15 MG/DL (ref 0.7–1.3)
DIFFERENTIAL METHOD BLD: ABNORMAL
EOSINOPHIL # BLD: 0.3 K/UL (ref 0–0.4)
EOSINOPHIL NFR BLD: 4 % (ref 0–7)
EPITH CASTS URNS QL MICRO: ABNORMAL /LPF
ERYTHROCYTE [DISTWIDTH] IN BLOOD BY AUTOMATED COUNT: 13.2 % (ref 11.5–14.5)
EST. AVERAGE GLUCOSE BLD GHB EST-MCNC: 131 MG/DL
GLOBULIN SER CALC-MCNC: 3.9 G/DL (ref 2–4)
GLUCOSE SERPL-MCNC: 145 MG/DL (ref 65–100)
GLUCOSE UR STRIP.AUTO-MCNC: NEGATIVE MG/DL
HBA1C MFR BLD: 6.2 % (ref 4–5.6)
HCO3 BLD-SCNC: 26.7 MMOL/L (ref 22–26)
HCT VFR BLD AUTO: 48 % (ref 36.6–50.3)
HGB BLD-MCNC: 15.8 G/DL (ref 12.1–17)
HGB UR QL STRIP: NEGATIVE
HISTORY CHECKED?,CKHIST: NORMAL
HYALINE CASTS URNS QL MICRO: ABNORMAL /LPF (ref 0–5)
IMM GRANULOCYTES # BLD AUTO: 0 K/UL (ref 0–0.04)
IMM GRANULOCYTES NFR BLD AUTO: 1 % (ref 0–0.5)
INR PPP: 1 (ref 0.9–1.1)
KETONES UR QL STRIP.AUTO: NEGATIVE MG/DL
LEUKOCYTE ESTERASE UR QL STRIP.AUTO: ABNORMAL
LYMPHOCYTES # BLD: 1.4 K/UL (ref 0.8–3.5)
LYMPHOCYTES NFR BLD: 21 % (ref 12–49)
MAGNESIUM SERPL-MCNC: 1.7 MG/DL (ref 1.6–2.4)
MCH RBC QN AUTO: 31.6 PG (ref 26–34)
MCHC RBC AUTO-ENTMCNC: 32.9 G/DL (ref 30–36.5)
MCV RBC AUTO: 96 FL (ref 80–99)
MONOCYTES # BLD: 0.7 K/UL (ref 0–1)
MONOCYTES NFR BLD: 10 % (ref 5–13)
NEUTS SEG # BLD: 4.4 K/UL (ref 1.8–8)
NEUTS SEG NFR BLD: 63 % (ref 32–75)
NITRITE UR QL STRIP.AUTO: NEGATIVE
NRBC # BLD: 0 K/UL (ref 0–0.01)
NRBC BLD-RTO: 0 PER 100 WBC
O2/TOTAL GAS SETTING VFR VENT: 21 %
PCO2 BLD: 40.9 MMHG (ref 35–45)
PH BLD: 7.42 [PH] (ref 7.35–7.45)
PH UR STRIP: 6 [PH] (ref 5–8)
PLATELET # BLD AUTO: 206 K/UL (ref 150–400)
PMV BLD AUTO: 10.6 FL (ref 8.9–12.9)
PO2 BLD: 72 MMHG (ref 80–100)
POTASSIUM SERPL-SCNC: 3.7 MMOL/L (ref 3.5–5.1)
PROT SERPL-MCNC: 7.5 G/DL (ref 6.4–8.2)
PROT UR STRIP-MCNC: NEGATIVE MG/DL
PROTHROMBIN TIME: 10.7 SEC (ref 9–11.1)
RBC # BLD AUTO: 5 M/UL (ref 4.1–5.7)
RBC #/AREA URNS HPF: ABNORMAL /HPF (ref 0–5)
SAO2 % BLD: 94.5 % (ref 92–97)
SODIUM SERPL-SCNC: 138 MMOL/L (ref 136–145)
SP GR UR REFRACTOMETRY: 1.01 (ref 1–1.03)
SPECIMEN TYPE: ABNORMAL
THERAPEUTIC RANGE,PTTT: NORMAL SECS (ref 58–77)
TSH SERPL DL<=0.05 MIU/L-ACNC: 0.72 UIU/ML (ref 0.36–3.74)
UA: UC IF INDICATED,UAUC: ABNORMAL
UROBILINOGEN UR QL STRIP.AUTO: 0.2 EU/DL (ref 0.2–1)
WBC # BLD AUTO: 7 K/UL (ref 4.1–11.1)
WBC URNS QL MICRO: ABNORMAL /HPF (ref 0–4)

## 2021-07-12 PROCEDURE — 85610 PROTHROMBIN TIME: CPT

## 2021-07-12 PROCEDURE — 83036 HEMOGLOBIN GLYCOSYLATED A1C: CPT

## 2021-07-12 PROCEDURE — 36415 COLL VENOUS BLD VENIPUNCTURE: CPT

## 2021-07-12 PROCEDURE — 36600 WITHDRAWAL OF ARTERIAL BLOOD: CPT

## 2021-07-12 PROCEDURE — 81001 URINALYSIS AUTO W/SCOPE: CPT

## 2021-07-12 PROCEDURE — 85730 THROMBOPLASTIN TIME PARTIAL: CPT

## 2021-07-12 PROCEDURE — 80053 COMPREHEN METABOLIC PANEL: CPT

## 2021-07-12 PROCEDURE — 83880 ASSAY OF NATRIURETIC PEPTIDE: CPT

## 2021-07-12 PROCEDURE — 86900 BLOOD TYPING SEROLOGIC ABO: CPT

## 2021-07-12 PROCEDURE — 93005 ELECTROCARDIOGRAM TRACING: CPT

## 2021-07-12 PROCEDURE — 71046 X-RAY EXAM CHEST 2 VIEWS: CPT

## 2021-07-12 PROCEDURE — 85025 COMPLETE CBC W/AUTO DIFF WBC: CPT

## 2021-07-12 PROCEDURE — 83735 ASSAY OF MAGNESIUM: CPT

## 2021-07-12 PROCEDURE — 82803 BLOOD GASES ANY COMBINATION: CPT

## 2021-07-12 PROCEDURE — 86923 COMPATIBILITY TEST ELECTRIC: CPT

## 2021-07-12 PROCEDURE — 84443 ASSAY THYROID STIM HORMONE: CPT

## 2021-07-12 NOTE — H&P
CSS   History and Physical    Subjective:      Azeem Dominguez is a 79 y.o. male  with PMHx of AS, DM, Dyslipidemia, CKD III, Depression, that is referred to the [address] [place] [address] by Dr. Benjamin Nicolas for interventional evaluation of  Aortic Stenosis. He really started experiencing symptoms about a year ago. He tried to run on [employer] (he has been able to do this for short distances) and could not do it. He was significantly winded and fatigued. He has had multiple knee and shoulder surgeries which do impact his activities some but he is still able to complete his 3 mile walk daily. He is able go up about 20 stairs before getting winded. Occasionally notes palpitations. No real dizziness, no syncope or recent falls. Denies orthopnea, PND, LE edema, GIB, or HF hospitalizations in the last year. His Flomax was recently changed to Alfuzosin.      Chest tightness which radiates to his left arm. Symptoms occur with activity and rest. Pain can last a couple of hours. He rates this between a 6-9. Associated diaphoresis-mostly at night. Denies associated N/V, dizziness, syncope. Has gained about 5 lbs recently. He is a retired business owner. He does not smoke or use recreational drugs. He drinks vodka daily. He is completely independent in his ADLs. He is  and his wife can help him following surgery.      Cardiac Testing    TTE:  3/3/21 Done at [facility] and scanned in  CAROL: 1.2 cm2  M mmHg  P mmHg  PV: 3.9 m/s        Cardiac catheterization: 5/10/21  Conclusion        · Catheters used: 5 Fr JL3.5, JR4 diagnostic catheters for coronary angiography; 5 Fr AL1 to cross the aortic valve and 6 Fr JR4 guide with SH and 4 Fr angled glide catheter for simultaneous aortic and LV pressure measurement  · Heparin was used for procedural anticoagulation  · Uncomplicated ultrasound guided access of the right IJ vein and left radial artery  · Successful hemostasis of the 6 Fr slender right IJ vein access site using manual compression and the 6 Fr slender LRA access site using a TR band     1. Mildly elevated right-sided filling pressures (RA 17/11/10 mmHg) with normal left-sided filling pressures (PCWP 17/14/13 mmHg).     2. Normal pulmonary artery pressures (PA 32/13/21 mmHg) with normal PVR (1.18 Gallo), TPG 8 mmHg.     3. Normal cardiac output (Flako 6.76 L/min) and cardiac index (Flako 3.19 L/min/m2).     4. Normal SVR (1124 dynes/sec/cm-5).    5. Symptomatic severe calcific aortic stenosis, CAROL 0.99 cm2, AV MG 51.75 mmHg.     6. Moderate 1-vessel CAD with a 50% proximal LAD and 50-60% mid LAD stenosis in a right dominant coronary circulation.     7. Evaluate for aortic valve replacement.     8. Results discussed with the patient and the patient's wife.           Complications     Complications documented before study signed (5/10/2021  9:55 PM)      No complications were associated with this study. Documented by Yolanda Kimble MD - 5/10/2021  4:12 PM      Coronary Findings     Diagnostic  Dominance: Right  Left Main   The vessel was visualized by angiography. The vessel is angiographically normal.   Left Anterior Descending   Prox LAD lesion, 50% stenosed. Prox LAD to Mid LAD lesion, 50% stenosed. Mid LAD lesion, 55% stenosed. Left Circumflex   The vessel was visualized by angiography. The vessel exhibits minimal luminal irregularities. Right Coronary Artery   The vessel was visualized by angiography. The vessel exhibits minimal luminal irregularities. Intervention     No interventions have been documented. Left Heart     Left Ventricle LV EDP is: 18. Right Heart     Right Heart Cath Pembroke-Thom catheter inserted via right internal jugular vein. No pulmonary hypertension noted. Severe aortic stenosis was noted.    Vitals - VO2 Value: 264.95 ml/min  Hb: 15.9 %   Blood Oximetry Information - AV Hb PV: 0 gm/dL   Pressure Phase - Phase: Phase: Baseline   AO Pressures - AO Systolic: 936 mmHg  AO Diastolic: 81 mmHg  AO Mean: 101 mmHg  Heart Rate: 73 bpm   PA Pressures - PA Systolic: 32 mmHg  PA Diastolic: 13 mmHg  PA Mean: 21 mmHg   RV Pressures - RV Systolic: 41 mmHg  RV End Diastolic: 14 mmHg  RV dP/dt: 384 mmHg/sec   LV Pressures - LV Systolic: 562 mmHg  LV End Diastolic: 32 mmHg  LV dP/dt: 3072 mmHg/sec   RA Pressures - RA Wedge A Wave: 17 mmHg  RA Wedge V Wave: 11 mmHg  RA Wedge Mean: 10 mmHg   PCW Pressures - PCW A Wave: 17 mmHg PCW V Wave: 14 mmHg  PCW Mean: 12 mmHg   Atrial Wedge Pressures - Source: Measured  RA Atrial Wedge Heart Rate: 75 bpm   Cath Pressure - FA End Diastolic Cath Pressure: 74 mmHg  PCW Source: Measured  PCW Heart Rate: 78 bpm   Cardiac Output Results - Flako C.O.: 6.76 L/min  Flako C. I.: 3.19 L/min/m2  QPI: 3.19 L/min/m2  QSI Value: 3.19 L/min/m2   Resistance Results - PVR: 106.43 dsc-5  PVR-I: 225.59 dsc-5/m2  SVR: 1076.16 dsc-5  SVR-I: 2281.01 dsc-5/m2  PVR/SVR: 0.1  TPR: 248.35 dsc-5  TPR-I: 526.39 dsc-5/m2  TVR: 1194.42 dsc-5  TVR-I: 2531.67 dsc-5/m2  TPR/TVR: 0.21   Aortic Valve Results - AV SEP: 21.54 sec/beat  AV Area: 0.99 cm2  AV Flow: 313.95 cc  AV Index: 0.46 cm2/m2  AV Gradient: 51.75 mmHg   Stroke Work Results - LVSW: 112.14 gm*m  LVSW-I: 52.91 gm*m/m2  RVSW: 12.81 gm*m  RVSW-I: 6.04 gm*m/m2   Valve Results - AV SEP: 21.54 sec/beat  AV Flow: 313.95 cc  AV Area: 0.99 cm2  AV Index: 0.46 cm2/m2  Aortic Valve HR: 79 bpm  AV Gradient: 51.75 mmHg  TPR/TVR: 0.21  PVR/SVR: 0.1         Past Medical History:   Diagnosis Date    Aortic stenosis     Diabetes (HCC)     Diastolic heart failure (HCC)     Dyslipidemia     PVD (peripheral vascular disease) ([facility])      Past Surgical History:   Procedure Laterality Date    HX KNEE ARTHROSCOPY      HX SHOULDER ARTHROSCOPY        Social History     Tobacco Use    Smoking status: Former Smoker    Smokeless tobacco: Never Used   Substance Use Topics    Alcohol use:  Yes     Alcohol/week: 1.0 standard drinks     Types: 1 Standard drinks or equivalent per week      Family History   Problem Relation Age of Onset    Dementia Mother     Alzheimer Father     Heart Attack Brother      Prior to Admission medications    Medication Sig Start Date End Date Taking? Authorizing Provider   alfuzosin SR (UROXATRAL) 10 mg SR tablet Take 10 mg by mouth daily. Provider, Historical   tadalafiL (Cialis) 2.5 mg tablet Take 2.5 mg by mouth daily. Provider, Historical   fenofibric acid (TRILIPIX ER) 135 mg capsule Take 135 mg by mouth daily. Provider, Historical   FLUoxetine (PROzac) 20 mg tablet Take 20 mg by mouth daily. Provider, Historical   mirtazapine (REMERON) 45 mg tablet Take 45 mg by mouth nightly. Provider, Historical   simvastatin (ZOCOR) 40 mg tablet Take 40 mg by mouth nightly. Provider, Historical   tamsulosin (FLOMAX) 0.4 mg capsule Take 0.4 mg by mouth daily. Patient not taking: Reported on 6/23/2021    Provider, Historical   testosterone 12.5 mg/ 1.25 gram (1 %) glpm 2 Pump(s) by TransDERmal route daily. Provider, Historical   aspirin delayed-release 81 mg tablet Take 1 Tab by mouth daily.  5/10/21   Rachael Zheng MD       Allergies   Allergen Reactions    Ceftin [Cefuroxime Axetil] Hives     Hives and vomiting    Ciprofloxacin Other (comments)     Light headedness       Review of Systems:   Constitutional: Negative fever, negative chills, +fatigue  Eyes:               Negative for amauroses fugax, +glasses  ENT:                Negative sore throat,oral abscess   Endocrine        Negative for thyroid replacement Rx; goiter; DM  Respiratory:     Negative chronic cough,sputum production, +shortness of breath  Cards:              Negative for varicosities, claudication, lower extremity edema, +Chest pain, +arm pain, +palpitations  GI:                   Negative for dysphagia, bleeding, nausea, vomiting, diarrhea, and abdominal pain, +GERD  Genitourinary: Negative for dysuria, +BPH, +frequency  Integument:     Negative for rash and pruritus  Hematologic:   Negative for bleeding dyscrasia, +easy bruising  Musculoskel:   Negative for muscle weakness inhibiting ambulation  Neurological:   Negative for stroke, TIA, syncope, dizziness  Behavl/Psych: Negative for feelings of anxiety, depression     Objective:     VS:   Height: 5' 11\"  Weight: 203 lb  Temp: 98 F  BP: 153/95  HR: 80  RR: 18  O2: 94 % Room air    Physical Exam:    General: Well nourished well groomed male appearing stated age  Neuro: A&OX3. SAUCEDA. PERRL. Steady unassisted gait  Head:Normocephalic. Atraumatic. Symmetrical  Neck: Trachea Midline  Resp: CTA B. No Adv BS/cough/sputum/tachypnea with seated conversation  CV: S1S2 RRR. ANÍBAL III/VI. No JVD/carotid bruits. Pink/warm/dry extremities. No LE peripheral edema  GI:Benign ab. Soft. NT/ND. Active BS  : Voids  Integ: No obvious s/s of infection or breakdown  Musculo/Skeletal: FROM in all major joints. Good muscle tone    Labs:   Recent Labs     21  1110   WBC 7.0   HGB 15.8   HCT 48.0         K 3.7   BUN 16   CREA 1.15   *   INR 1.0       Diagnostics:   PA and lateral:   Completed-report pending.     Carotid doppler: 21 Done at [facility]-scanned in  CHINO: 0%  LICA: 5-54%    PFTS-FEV1: None    EK2021 11:55 AM - Jose, Card Result In    Component Value Ref Range & Units Status   Ventricular Rate 79  BPM Preliminary   Atrial Rate 79  BPM Preliminary   P-R Interval 156  ms Preliminary   QRS Duration 92  ms Preliminary   Q-T Interval 356  ms Preliminary   QTC Calculation (Bezet) 408  ms Preliminary   Calculated P Axis 44  degrees Preliminary   Calculated R Axis -51  degrees Preliminary   Calculated T Axis 82  degrees Preliminary   Diagnosis   Preliminary   Normal sinus rhythm   Left axis deviation   Inferior infarct , age undetermined         Assessment:     Active Problems:    Nonrheumatic aortic valve stenosis (2021)        Plan:   The risk and benefit of surgery were reviewed with patient and family and all questions answered and the patient wishes to proceed. Risk include infection, bleeding, stroke, heart attack, irregular heart rhythm, kidney failure and death. The patient was given instructions. Surgery is scheduled for 7/14/21. STS 4.2 Risk Score / Predicted 30 day mortality: - calculations scanned into EMR  AVR  Risk of Mortality: 0.643%  Morbidity or Mortality: 6.687%     AVR/CABG:  Risk of Mortality: 0.929%  Morbidity or Mortality: 9.139%      Treatment Plan:      1. Aortic Stenosis-severe and symptomatic. Intermediate Surgical risk. Patient is agreeable to proceed with TAVR. Plan for RTF 29 S3 with MAC.     2. DM II: Diet controlled. Followed by PCP. A1c of 6.2%     3. Moderate 1-vessel CAD with a 50% proximal LAD and 50-60% mid LAD stenosis in a right dominant coronary circulation. On ASA, Statin. Not historically on BB     4. Dyslipidemia: On Simvastatin and Tricor     5. CKD III: Last Creatinine of 1.15. Avoid hypotension and nephrotoxic agents.      6. Depression: On Prozac     7.  BPH: On Alfuzosin      Signed By: Judah Orlando NP     July 12, 2021

## 2021-07-13 LAB
ATRIAL RATE: 79 BPM
CALCULATED P AXIS, ECG09: 44 DEGREES
CALCULATED R AXIS, ECG10: -51 DEGREES
CALCULATED T AXIS, ECG11: 82 DEGREES
DIAGNOSIS, 93000: NORMAL
P-R INTERVAL, ECG05: 156 MS
Q-T INTERVAL, ECG07: 356 MS
QRS DURATION, ECG06: 92 MS
QTC CALCULATION (BEZET), ECG08: 408 MS
VENTRICULAR RATE, ECG03: 79 BPM

## 2021-07-13 RX ORDER — SODIUM CHLORIDE 9 MG/ML
1.5-3 INJECTION, SOLUTION INTRAVENOUS
Status: DISCONTINUED | OUTPATIENT
Start: 2021-07-14 | End: 2021-07-14

## 2021-07-13 RX ORDER — DEXMEDETOMIDINE HYDROCHLORIDE 4 UG/ML
.1-1.5 INJECTION, SOLUTION INTRAVENOUS
Status: DISCONTINUED | OUTPATIENT
Start: 2021-07-14 | End: 2021-07-14

## 2021-07-14 ENCOUNTER — ANESTHESIA EVENT (OUTPATIENT)
Dept: CARDIOTHORACIC SURGERY | Age: 71
DRG: 267 | End: 2021-07-14
Payer: MEDICARE

## 2021-07-14 ENCOUNTER — APPOINTMENT (OUTPATIENT)
Dept: GENERAL RADIOLOGY | Age: 71
DRG: 267 | End: 2021-07-14
Attending: NURSE PRACTITIONER
Payer: MEDICARE

## 2021-07-14 ENCOUNTER — APPOINTMENT (OUTPATIENT)
Dept: NON INVASIVE DIAGNOSTICS | Age: 71
DRG: 267 | End: 2021-07-14
Attending: THORACIC SURGERY (CARDIOTHORACIC VASCULAR SURGERY)
Payer: MEDICARE

## 2021-07-14 ENCOUNTER — HOSPITAL ENCOUNTER (INPATIENT)
Age: 71
LOS: 1 days | Discharge: HOME | DRG: 267 | End: 2021-07-15
Attending: THORACIC SURGERY (CARDIOTHORACIC VASCULAR SURGERY) | Admitting: THORACIC SURGERY (CARDIOTHORACIC VASCULAR SURGERY)
Payer: MEDICARE

## 2021-07-14 ENCOUNTER — APPOINTMENT (OUTPATIENT)
Dept: GENERAL RADIOLOGY | Age: 71
DRG: 267 | End: 2021-07-14
Attending: THORACIC SURGERY (CARDIOTHORACIC VASCULAR SURGERY)
Payer: MEDICARE

## 2021-07-14 ENCOUNTER — ANESTHESIA (OUTPATIENT)
Dept: CARDIOTHORACIC SURGERY | Age: 71
DRG: 267 | End: 2021-07-14
Payer: MEDICARE

## 2021-07-14 PROBLEM — I35.0 AORTIC STENOSIS: Status: ACTIVE | Noted: 2021-07-14

## 2021-07-14 LAB
ADMINISTERED INITIALS, ADMINIT: NORMAL
ADMINISTERED INITIALS, ADMINIT: NORMAL
ALBUMIN SERPL-MCNC: 3.2 G/DL (ref 3.5–5)
ALBUMIN/GLOB SERPL: 0.9 {RATIO} (ref 1.1–2.2)
ALP SERPL-CCNC: 34 U/L (ref 45–117)
ALT SERPL-CCNC: 41 U/L (ref 12–78)
ANION GAP SERPL CALC-SCNC: 3 MMOL/L (ref 5–15)
APTT PPP: 28.6 SEC (ref 22.1–31)
AST SERPL-CCNC: 34 U/L (ref 15–37)
BASOPHILS # BLD: 0.1 K/UL (ref 0–0.1)
BASOPHILS NFR BLD: 1 % (ref 0–1)
BILIRUB SERPL-MCNC: 0.5 MG/DL (ref 0.2–1)
BUN SERPL-MCNC: 16 MG/DL (ref 6–20)
BUN/CREAT SERPL: 15 (ref 12–20)
CALCIUM SERPL-MCNC: 8.7 MG/DL (ref 8.5–10.1)
CHLORIDE SERPL-SCNC: 109 MMOL/L (ref 97–108)
CO2 SERPL-SCNC: 27 MMOL/L (ref 21–32)
CREAT SERPL-MCNC: 1.07 MG/DL (ref 0.7–1.3)
D50 ADMINISTERED, D50ADM: 0 ML
D50 ADMINISTERED, D50ADM: 0 ML
D50 ORDER, D50ORD: 0 ML
D50 ORDER, D50ORD: 0 ML
DIFFERENTIAL METHOD BLD: ABNORMAL
EOSINOPHIL # BLD: 0.4 K/UL (ref 0–0.4)
EOSINOPHIL NFR BLD: 5 % (ref 0–7)
ERYTHROCYTE [DISTWIDTH] IN BLOOD BY AUTOMATED COUNT: 13.4 % (ref 11.5–14.5)
GLOBULIN SER CALC-MCNC: 3.5 G/DL (ref 2–4)
GLSCOM COMMENTS: NORMAL
GLSCOM COMMENTS: NORMAL
GLUCOSE BLD STRIP.AUTO-MCNC: 111 MG/DL (ref 65–117)
GLUCOSE BLD STRIP.AUTO-MCNC: 126 MG/DL (ref 65–117)
GLUCOSE SERPL-MCNC: 134 MG/DL (ref 65–100)
GLUCOSE, GLC: 122 MG/DL
GLUCOSE, GLC: 131 MG/DL
HCT VFR BLD AUTO: 44.8 % (ref 36.6–50.3)
HCT VFR BLD AUTO: 48.1 % (ref 36.6–50.3)
HGB BLD-MCNC: 14.7 G/DL (ref 12.1–17)
HGB BLD-MCNC: 15.9 G/DL (ref 12.1–17)
HIGH TARGET, HITG: 140 MG/DL
HIGH TARGET, HITG: 140 MG/DL
IMM GRANULOCYTES # BLD AUTO: 0 K/UL (ref 0–0.04)
IMM GRANULOCYTES NFR BLD AUTO: 1 % (ref 0–0.5)
INR PPP: 1.1 (ref 0.9–1.1)
INSULIN ADMINSTERED, INSADM: 1.9 UNITS/HOUR
INSULIN ADMINSTERED, INSADM: 2.1 UNITS/HOUR
INSULIN ORDER, INSORD: 1.9 UNITS/HOUR
INSULIN ORDER, INSORD: 2.1 UNITS/HOUR
LOW TARGET, LOT: 100 MG/DL
LOW TARGET, LOT: 100 MG/DL
LYMPHOCYTES # BLD: 1 K/UL (ref 0.8–3.5)
LYMPHOCYTES NFR BLD: 14 % (ref 12–49)
MAGNESIUM SERPL-MCNC: 1.8 MG/DL (ref 1.6–2.4)
MAGNESIUM SERPL-MCNC: 2 MG/DL (ref 1.6–2.4)
MCH RBC QN AUTO: 31.7 PG (ref 26–34)
MCHC RBC AUTO-ENTMCNC: 32.8 G/DL (ref 30–36.5)
MCV RBC AUTO: 96.6 FL (ref 80–99)
MINUTES UNTIL NEXT BG, NBG: 60 MIN
MINUTES UNTIL NEXT BG, NBG: 60 MIN
MONOCYTES # BLD: 0.5 K/UL (ref 0–1)
MONOCYTES NFR BLD: 7 % (ref 5–13)
MULTIPLIER, MUL: 0.03
MULTIPLIER, MUL: 0.03
NEUTS SEG # BLD: 5.1 K/UL (ref 1.8–8)
NEUTS SEG NFR BLD: 72 % (ref 32–75)
NRBC # BLD: 0 K/UL (ref 0–0.01)
NRBC BLD-RTO: 0 PER 100 WBC
ORDER INITIALS, ORDINIT: NORMAL
ORDER INITIALS, ORDINIT: NORMAL
PLATELET # BLD AUTO: 161 K/UL (ref 150–400)
PMV BLD AUTO: 10.6 FL (ref 8.9–12.9)
POTASSIUM SERPL-SCNC: 4 MMOL/L (ref 3.5–5.1)
POTASSIUM SERPL-SCNC: 4.3 MMOL/L (ref 3.5–5.1)
PROT SERPL-MCNC: 6.7 G/DL (ref 6.4–8.2)
PROTHROMBIN TIME: 11.7 SEC (ref 9–11.1)
RBC # BLD AUTO: 4.64 M/UL (ref 4.1–5.7)
SERVICE CMNT-IMP: ABNORMAL
SERVICE CMNT-IMP: NORMAL
SODIUM SERPL-SCNC: 139 MMOL/L (ref 136–145)
THERAPEUTIC RANGE,PTTT: NORMAL SECS (ref 58–77)
WBC # BLD AUTO: 7.1 K/UL (ref 4.1–11.1)

## 2021-07-14 PROCEDURE — 77030004549 HC CATH ANGI DX PRF MRTM -A: Performed by: THORACIC SURGERY (CARDIOTHORACIC VASCULAR SURGERY)

## 2021-07-14 PROCEDURE — 74011000258 HC RX REV CODE- 258: Performed by: THORACIC SURGERY (CARDIOTHORACIC VASCULAR SURGERY)

## 2021-07-14 PROCEDURE — C1760 CLOSURE DEV, VASC: HCPCS | Performed by: THORACIC SURGERY (CARDIOTHORACIC VASCULAR SURGERY)

## 2021-07-14 PROCEDURE — 74011000250 HC RX REV CODE- 250: Performed by: NURSE PRACTITIONER

## 2021-07-14 PROCEDURE — C1769 GUIDE WIRE: HCPCS | Performed by: THORACIC SURGERY (CARDIOTHORACIC VASCULAR SURGERY)

## 2021-07-14 PROCEDURE — 77030004532 HC CATH ANGI DX IMP BSC -A: Performed by: THORACIC SURGERY (CARDIOTHORACIC VASCULAR SURGERY)

## 2021-07-14 PROCEDURE — 77030011992 HC AIRWY NASOPHGL TELE -A: Performed by: ANESTHESIOLOGY

## 2021-07-14 PROCEDURE — 77030019702 HC WRP THER MENM -C: Performed by: THORACIC SURGERY (CARDIOTHORACIC VASCULAR SURGERY)

## 2021-07-14 PROCEDURE — 02HK3JZ INSERTION OF PACEMAKER LEAD INTO RIGHT VENTRICLE, PERCUTANEOUS APPROACH: ICD-10-PCS | Performed by: INTERNAL MEDICINE

## 2021-07-14 PROCEDURE — 74011250636 HC RX REV CODE- 250/636: Performed by: NURSE PRACTITIONER

## 2021-07-14 PROCEDURE — 02RF38Z REPLACEMENT OF AORTIC VALVE WITH ZOOPLASTIC TISSUE, PERCUTANEOUS APPROACH: ICD-10-PCS | Performed by: INTERNAL MEDICINE

## 2021-07-14 PROCEDURE — 5A1223Z PERFORMANCE OF CARDIAC PACING, CONTINUOUS: ICD-10-PCS | Performed by: INTERNAL MEDICINE

## 2021-07-14 PROCEDURE — 74011250637 HC RX REV CODE- 250/637: Performed by: THORACIC SURGERY (CARDIOTHORACIC VASCULAR SURGERY)

## 2021-07-14 PROCEDURE — 33361 REPLACE AORTIC VALVE PERQ: CPT | Performed by: THORACIC SURGERY (CARDIOTHORACIC VASCULAR SURGERY)

## 2021-07-14 PROCEDURE — 85610 PROTHROMBIN TIME: CPT

## 2021-07-14 PROCEDURE — C1894 INTRO/SHEATH, NON-LASER: HCPCS | Performed by: THORACIC SURGERY (CARDIOTHORACIC VASCULAR SURGERY)

## 2021-07-14 PROCEDURE — 77030004538 HC CATH ANGI DX MP BSC -A: Performed by: THORACIC SURGERY (CARDIOTHORACIC VASCULAR SURGERY)

## 2021-07-14 PROCEDURE — 74011000250 HC RX REV CODE- 250: Performed by: THORACIC SURGERY (CARDIOTHORACIC VASCULAR SURGERY)

## 2021-07-14 PROCEDURE — 74011250637 HC RX REV CODE- 250/637: Performed by: NURSE PRACTITIONER

## 2021-07-14 PROCEDURE — 77030041244 HC CBL PACE EXT TEMP REMG -B: Performed by: THORACIC SURGERY (CARDIOTHORACIC VASCULAR SURGERY)

## 2021-07-14 PROCEDURE — 85025 COMPLETE CBC W/AUTO DIFF WBC: CPT

## 2021-07-14 PROCEDURE — 83735 ASSAY OF MAGNESIUM: CPT

## 2021-07-14 PROCEDURE — 77030013797 HC KT TRNSDUC PRSSR EDWD -A

## 2021-07-14 PROCEDURE — 74011636637 HC RX REV CODE- 636/637: Performed by: THORACIC SURGERY (CARDIOTHORACIC VASCULAR SURGERY)

## 2021-07-14 PROCEDURE — 77030037468 HC VLV AORT EDWRD -L: Performed by: THORACIC SURGERY (CARDIOTHORACIC VASCULAR SURGERY)

## 2021-07-14 PROCEDURE — 2709999900 HC NON-CHARGEABLE SUPPLY

## 2021-07-14 PROCEDURE — 74011250636 HC RX REV CODE- 250/636: Performed by: THORACIC SURGERY (CARDIOTHORACIC VASCULAR SURGERY)

## 2021-07-14 PROCEDURE — 82962 GLUCOSE BLOOD TEST: CPT

## 2021-07-14 PROCEDURE — 76060000035 HC ANESTHESIA 2 TO 2.5 HR: Performed by: THORACIC SURGERY (CARDIOTHORACIC VASCULAR SURGERY)

## 2021-07-14 PROCEDURE — 36415 COLL VENOUS BLD VENIPUNCTURE: CPT

## 2021-07-14 PROCEDURE — 76210000017 HC OR PH I REC 1.5 TO 2 HR: Performed by: THORACIC SURGERY (CARDIOTHORACIC VASCULAR SURGERY)

## 2021-07-14 PROCEDURE — 80053 COMPREHEN METABOLIC PANEL: CPT

## 2021-07-14 PROCEDURE — 76010000108 HC CV SURG 2 TO 2.5 HR: Performed by: THORACIC SURGERY (CARDIOTHORACIC VASCULAR SURGERY)

## 2021-07-14 PROCEDURE — 84132 ASSAY OF SERUM POTASSIUM: CPT

## 2021-07-14 PROCEDURE — 85018 HEMOGLOBIN: CPT

## 2021-07-14 PROCEDURE — 93321 DOPPLER ECHO F-UP/LMTD STD: CPT

## 2021-07-14 PROCEDURE — 77030040361 HC SLV COMPR DVT MDII -B

## 2021-07-14 PROCEDURE — 65660000001 HC RM ICU INTERMED STEPDOWN

## 2021-07-14 PROCEDURE — 71045 X-RAY EXAM CHEST 1 VIEW: CPT

## 2021-07-14 PROCEDURE — 85730 THROMBOPLASTIN TIME PARTIAL: CPT

## 2021-07-14 PROCEDURE — 77030040922 HC BLNKT HYPOTHRM STRY -A

## 2021-07-14 PROCEDURE — 2709999900 HC NON-CHARGEABLE SUPPLY: Performed by: THORACIC SURGERY (CARDIOTHORACIC VASCULAR SURGERY)

## 2021-07-14 PROCEDURE — 93005 ELECTROCARDIOGRAM TRACING: CPT

## 2021-07-14 PROCEDURE — 74011000636 HC RX REV CODE- 636: Performed by: THORACIC SURGERY (CARDIOTHORACIC VASCULAR SURGERY)

## 2021-07-14 PROCEDURE — 77030005402 HC CATH RAD ART LN KT TELE -B

## 2021-07-14 DEVICE — SYSTEM CARD 29MM BOV WITH COMMAND DEL SYTEM ESHEATH INTRO SET: Type: IMPLANTABLE DEVICE | Site: AORTIC VALVE | Status: FUNCTIONAL

## 2021-07-14 RX ORDER — MORPHINE SULFATE 2 MG/ML
2 INJECTION, SOLUTION INTRAMUSCULAR; INTRAVENOUS
Status: DISCONTINUED | OUTPATIENT
Start: 2021-07-14 | End: 2021-07-15 | Stop reason: HOSPADM

## 2021-07-14 RX ORDER — ONDANSETRON 2 MG/ML
4 INJECTION INTRAMUSCULAR; INTRAVENOUS AS NEEDED
Status: DISCONTINUED | OUTPATIENT
Start: 2021-07-14 | End: 2021-07-14

## 2021-07-14 RX ORDER — SODIUM CHLORIDE 0.9 % (FLUSH) 0.9 %
5-40 SYRINGE (ML) INJECTION EVERY 8 HOURS
Status: DISCONTINUED | OUTPATIENT
Start: 2021-07-14 | End: 2021-07-14 | Stop reason: HOSPADM

## 2021-07-14 RX ORDER — HEPARIN SODIUM 200 [USP'U]/100ML
INJECTION, SOLUTION INTRAVENOUS
Status: COMPLETED | OUTPATIENT
Start: 2021-07-14 | End: 2021-07-14

## 2021-07-14 RX ORDER — MIDAZOLAM HYDROCHLORIDE 1 MG/ML
0.5 INJECTION, SOLUTION INTRAMUSCULAR; INTRAVENOUS
Status: DISCONTINUED | OUTPATIENT
Start: 2021-07-14 | End: 2021-07-14

## 2021-07-14 RX ORDER — PROTAMINE SULFATE 10 MG/ML
INJECTION, SOLUTION INTRAVENOUS AS NEEDED
Status: DISCONTINUED | OUTPATIENT
Start: 2021-07-14 | End: 2021-07-14 | Stop reason: HOSPADM

## 2021-07-14 RX ORDER — SODIUM CHLORIDE 9 MG/ML
25 INJECTION, SOLUTION INTRAVENOUS CONTINUOUS
Status: DISCONTINUED | OUTPATIENT
Start: 2021-07-14 | End: 2021-07-14 | Stop reason: HOSPADM

## 2021-07-14 RX ORDER — ACETAMINOPHEN 325 MG/1
650 TABLET ORAL ONCE
Status: DISCONTINUED | OUTPATIENT
Start: 2021-07-14 | End: 2021-07-14 | Stop reason: HOSPADM

## 2021-07-14 RX ORDER — SODIUM CHLORIDE 0.9 % (FLUSH) 0.9 %
5-40 SYRINGE (ML) INJECTION EVERY 8 HOURS
Status: DISCONTINUED | OUTPATIENT
Start: 2021-07-14 | End: 2021-07-15 | Stop reason: HOSPADM

## 2021-07-14 RX ORDER — SODIUM CHLORIDE 0.9 % (FLUSH) 0.9 %
5-40 SYRINGE (ML) INJECTION AS NEEDED
Status: DISCONTINUED | OUTPATIENT
Start: 2021-07-14 | End: 2021-07-14 | Stop reason: HOSPADM

## 2021-07-14 RX ORDER — ONDANSETRON 2 MG/ML
INJECTION INTRAMUSCULAR; INTRAVENOUS AS NEEDED
Status: DISCONTINUED | OUTPATIENT
Start: 2021-07-14 | End: 2021-07-14 | Stop reason: HOSPADM

## 2021-07-14 RX ORDER — MIDAZOLAM HYDROCHLORIDE 1 MG/ML
1 INJECTION, SOLUTION INTRAMUSCULAR; INTRAVENOUS AS NEEDED
Status: DISCONTINUED | OUTPATIENT
Start: 2021-07-14 | End: 2021-07-14 | Stop reason: HOSPADM

## 2021-07-14 RX ORDER — LIDOCAINE HYDROCHLORIDE 20 MG/ML
INJECTION, SOLUTION EPIDURAL; INFILTRATION; INTRACAUDAL; PERINEURAL AS NEEDED
Status: DISCONTINUED | OUTPATIENT
Start: 2021-07-14 | End: 2021-07-14 | Stop reason: HOSPADM

## 2021-07-14 RX ORDER — MORPHINE SULFATE 2 MG/ML
2 INJECTION, SOLUTION INTRAMUSCULAR; INTRAVENOUS
Status: DISCONTINUED | OUTPATIENT
Start: 2021-07-14 | End: 2021-07-14

## 2021-07-14 RX ORDER — SODIUM CHLORIDE 0.9 % (FLUSH) 0.9 %
5-40 SYRINGE (ML) INJECTION AS NEEDED
Status: DISCONTINUED | OUTPATIENT
Start: 2021-07-14 | End: 2021-07-15 | Stop reason: HOSPADM

## 2021-07-14 RX ORDER — LIDOCAINE HYDROCHLORIDE 10 MG/ML
0.1 INJECTION, SOLUTION EPIDURAL; INFILTRATION; INTRACAUDAL; PERINEURAL AS NEEDED
Status: DISCONTINUED | OUTPATIENT
Start: 2021-07-14 | End: 2021-07-14 | Stop reason: HOSPADM

## 2021-07-14 RX ORDER — SODIUM CHLORIDE, SODIUM LACTATE, POTASSIUM CHLORIDE, CALCIUM CHLORIDE 600; 310; 30; 20 MG/100ML; MG/100ML; MG/100ML; MG/100ML
100 INJECTION, SOLUTION INTRAVENOUS CONTINUOUS
Status: DISCONTINUED | OUTPATIENT
Start: 2021-07-14 | End: 2021-07-14

## 2021-07-14 RX ORDER — GUAIFENESIN 100 MG/5ML
81 LIQUID (ML) ORAL DAILY
Status: DISCONTINUED | OUTPATIENT
Start: 2021-07-15 | End: 2021-07-15 | Stop reason: HOSPADM

## 2021-07-14 RX ORDER — SODIUM CHLORIDE 9 MG/ML
9 INJECTION, SOLUTION INTRAVENOUS CONTINUOUS
Status: DISCONTINUED | OUTPATIENT
Start: 2021-07-14 | End: 2021-07-14

## 2021-07-14 RX ORDER — HYDRALAZINE HYDROCHLORIDE 20 MG/ML
10 INJECTION INTRAMUSCULAR; INTRAVENOUS
Status: DISCONTINUED | OUTPATIENT
Start: 2021-07-14 | End: 2021-07-15 | Stop reason: HOSPADM

## 2021-07-14 RX ORDER — ACETAMINOPHEN 325 MG/1
650 TABLET ORAL
Status: DISCONTINUED | OUTPATIENT
Start: 2021-07-14 | End: 2021-07-15 | Stop reason: HOSPADM

## 2021-07-14 RX ORDER — FLUOXETINE HYDROCHLORIDE 20 MG/1
20 CAPSULE ORAL DAILY
Status: DISCONTINUED | OUTPATIENT
Start: 2021-07-15 | End: 2021-07-15 | Stop reason: HOSPADM

## 2021-07-14 RX ORDER — NALOXONE HYDROCHLORIDE 0.4 MG/ML
0.4 INJECTION, SOLUTION INTRAMUSCULAR; INTRAVENOUS; SUBCUTANEOUS AS NEEDED
Status: DISCONTINUED | OUTPATIENT
Start: 2021-07-14 | End: 2021-07-15 | Stop reason: HOSPADM

## 2021-07-14 RX ORDER — MIDAZOLAM HYDROCHLORIDE 1 MG/ML
INJECTION, SOLUTION INTRAMUSCULAR; INTRAVENOUS AS NEEDED
Status: DISCONTINUED | OUTPATIENT
Start: 2021-07-14 | End: 2021-07-14 | Stop reason: HOSPADM

## 2021-07-14 RX ORDER — FAMOTIDINE 20 MG/1
20 TABLET, FILM COATED ORAL EVERY 12 HOURS
Status: DISCONTINUED | OUTPATIENT
Start: 2021-07-15 | End: 2021-07-15 | Stop reason: HOSPADM

## 2021-07-14 RX ORDER — MIRTAZAPINE 15 MG/1
45 TABLET, FILM COATED ORAL
Status: DISCONTINUED | OUTPATIENT
Start: 2021-07-14 | End: 2021-07-15 | Stop reason: HOSPADM

## 2021-07-14 RX ORDER — SODIUM CHLORIDE 0.9 % (FLUSH) 0.9 %
5-40 SYRINGE (ML) INJECTION AS NEEDED
Status: DISCONTINUED | OUTPATIENT
Start: 2021-07-14 | End: 2021-07-14

## 2021-07-14 RX ORDER — PROPOFOL 10 MG/ML
INJECTION, EMULSION INTRAVENOUS
Status: DISCONTINUED | OUTPATIENT
Start: 2021-07-14 | End: 2021-07-14 | Stop reason: HOSPADM

## 2021-07-14 RX ORDER — AMOXICILLIN 250 MG
1 CAPSULE ORAL 2 TIMES DAILY
Status: DISCONTINUED | OUTPATIENT
Start: 2021-07-15 | End: 2021-07-15 | Stop reason: HOSPADM

## 2021-07-14 RX ORDER — BUPIVACAINE HYDROCHLORIDE 5 MG/ML
INJECTION, SOLUTION EPIDURAL; INTRACAUDAL AS NEEDED
Status: DISCONTINUED | OUTPATIENT
Start: 2021-07-14 | End: 2021-07-14 | Stop reason: HOSPADM

## 2021-07-14 RX ORDER — SODIUM CHLORIDE 9 MG/ML
INJECTION, SOLUTION INTRAVENOUS
Status: DISCONTINUED | OUTPATIENT
Start: 2021-07-14 | End: 2021-07-14 | Stop reason: HOSPADM

## 2021-07-14 RX ORDER — ROPIVACAINE HYDROCHLORIDE 5 MG/ML
30 INJECTION, SOLUTION EPIDURAL; INFILTRATION; PERINEURAL AS NEEDED
Status: DISCONTINUED | OUTPATIENT
Start: 2021-07-14 | End: 2021-07-14 | Stop reason: HOSPADM

## 2021-07-14 RX ORDER — LANOLIN ALCOHOL/MO/W.PET/CERES
6 CREAM (GRAM) TOPICAL
Status: DISCONTINUED | OUTPATIENT
Start: 2021-07-14 | End: 2021-07-15 | Stop reason: HOSPADM

## 2021-07-14 RX ORDER — LANOLIN ALCOHOL/MO/W.PET/CERES
400 CREAM (GRAM) TOPICAL 2 TIMES DAILY
Status: DISCONTINUED | OUTPATIENT
Start: 2021-07-15 | End: 2021-07-15 | Stop reason: HOSPADM

## 2021-07-14 RX ORDER — FENTANYL CITRATE 50 UG/ML
25 INJECTION, SOLUTION INTRAMUSCULAR; INTRAVENOUS
Status: DISCONTINUED | OUTPATIENT
Start: 2021-07-14 | End: 2021-07-14

## 2021-07-14 RX ORDER — TRAMADOL HYDROCHLORIDE 50 MG/1
50-100 TABLET ORAL
Status: DISCONTINUED | OUTPATIENT
Start: 2021-07-14 | End: 2021-07-15 | Stop reason: HOSPADM

## 2021-07-14 RX ORDER — AMLODIPINE BESYLATE 5 MG/1
5 TABLET ORAL DAILY
Status: DISCONTINUED | OUTPATIENT
Start: 2021-07-14 | End: 2021-07-15 | Stop reason: HOSPADM

## 2021-07-14 RX ORDER — HEPARIN SODIUM 1000 [USP'U]/ML
INJECTION, SOLUTION INTRAVENOUS; SUBCUTANEOUS AS NEEDED
Status: DISCONTINUED | OUTPATIENT
Start: 2021-07-14 | End: 2021-07-14 | Stop reason: HOSPADM

## 2021-07-14 RX ORDER — SODIUM CHLORIDE, SODIUM LACTATE, POTASSIUM CHLORIDE, CALCIUM CHLORIDE 600; 310; 30; 20 MG/100ML; MG/100ML; MG/100ML; MG/100ML
INJECTION, SOLUTION INTRAVENOUS
Status: DISCONTINUED | OUTPATIENT
Start: 2021-07-14 | End: 2021-07-14 | Stop reason: HOSPADM

## 2021-07-14 RX ORDER — FACIAL-BODY WIPES
10 EACH TOPICAL DAILY PRN
Status: DISCONTINUED | OUTPATIENT
Start: 2021-07-14 | End: 2021-07-15 | Stop reason: HOSPADM

## 2021-07-14 RX ORDER — PROPOFOL 10 MG/ML
INJECTION, EMULSION INTRAVENOUS AS NEEDED
Status: DISCONTINUED | OUTPATIENT
Start: 2021-07-14 | End: 2021-07-14 | Stop reason: HOSPADM

## 2021-07-14 RX ORDER — TAMSULOSIN HYDROCHLORIDE 0.4 MG/1
0.4 CAPSULE ORAL DAILY
Status: DISCONTINUED | OUTPATIENT
Start: 2021-07-14 | End: 2021-07-15 | Stop reason: HOSPADM

## 2021-07-14 RX ORDER — KETAMINE HYDROCHLORIDE 10 MG/ML
INJECTION, SOLUTION INTRAMUSCULAR; INTRAVENOUS AS NEEDED
Status: DISCONTINUED | OUTPATIENT
Start: 2021-07-14 | End: 2021-07-14 | Stop reason: HOSPADM

## 2021-07-14 RX ORDER — ALBUTEROL SULFATE 0.83 MG/ML
2.5 SOLUTION RESPIRATORY (INHALATION)
Status: DISCONTINUED | OUTPATIENT
Start: 2021-07-14 | End: 2021-07-15 | Stop reason: HOSPADM

## 2021-07-14 RX ORDER — FENTANYL CITRATE 50 UG/ML
50 INJECTION, SOLUTION INTRAMUSCULAR; INTRAVENOUS AS NEEDED
Status: DISCONTINUED | OUTPATIENT
Start: 2021-07-14 | End: 2021-07-14 | Stop reason: HOSPADM

## 2021-07-14 RX ORDER — ONDANSETRON 2 MG/ML
4 INJECTION INTRAMUSCULAR; INTRAVENOUS
Status: DISCONTINUED | OUTPATIENT
Start: 2021-07-14 | End: 2021-07-15 | Stop reason: HOSPADM

## 2021-07-14 RX ORDER — HYDROMORPHONE HYDROCHLORIDE 1 MG/ML
0.5 INJECTION, SOLUTION INTRAMUSCULAR; INTRAVENOUS; SUBCUTANEOUS
Status: DISCONTINUED | OUTPATIENT
Start: 2021-07-14 | End: 2021-07-14

## 2021-07-14 RX ORDER — OXYCODONE AND ACETAMINOPHEN 5; 325 MG/1; MG/1
1-2 TABLET ORAL
Status: DISCONTINUED | OUTPATIENT
Start: 2021-07-14 | End: 2021-07-15 | Stop reason: HOSPADM

## 2021-07-14 RX ORDER — FENTANYL CITRATE 50 UG/ML
INJECTION, SOLUTION INTRAMUSCULAR; INTRAVENOUS AS NEEDED
Status: DISCONTINUED | OUTPATIENT
Start: 2021-07-14 | End: 2021-07-14 | Stop reason: HOSPADM

## 2021-07-14 RX ORDER — SODIUM CHLORIDE 450 MG/100ML
10 INJECTION, SOLUTION INTRAVENOUS CONTINUOUS
Status: DISCONTINUED | OUTPATIENT
Start: 2021-07-14 | End: 2021-07-14

## 2021-07-14 RX ORDER — SODIUM CHLORIDE, SODIUM LACTATE, POTASSIUM CHLORIDE, CALCIUM CHLORIDE 600; 310; 30; 20 MG/100ML; MG/100ML; MG/100ML; MG/100ML
100 INJECTION, SOLUTION INTRAVENOUS CONTINUOUS
Status: DISCONTINUED | OUTPATIENT
Start: 2021-07-14 | End: 2021-07-14 | Stop reason: HOSPADM

## 2021-07-14 RX ORDER — DIPHENHYDRAMINE HYDROCHLORIDE 50 MG/ML
12.5 INJECTION, SOLUTION INTRAMUSCULAR; INTRAVENOUS AS NEEDED
Status: ACTIVE | OUTPATIENT
Start: 2021-07-14 | End: 2021-07-14

## 2021-07-14 RX ADMIN — HEPARIN SODIUM 10000 UNITS: 1000 INJECTION, SOLUTION INTRAVENOUS; SUBCUTANEOUS at 08:27

## 2021-07-14 RX ADMIN — DEXMEDETOMIDINE HYDROCHLORIDE 0.7 MCG/KG/HR: 4 INJECTION, SOLUTION INTRAVENOUS at 07:31

## 2021-07-14 RX ADMIN — SODIUM CHLORIDE 3 ML/KG/HR: 9 INJECTION, SOLUTION INTRAVENOUS at 06:23

## 2021-07-14 RX ADMIN — MIDAZOLAM 1 MG: 1 INJECTION INTRAMUSCULAR; INTRAVENOUS at 08:24

## 2021-07-14 RX ADMIN — SODIUM CHLORIDE 5 MG/HR: 9 INJECTION, SOLUTION INTRAVENOUS at 14:25

## 2021-07-14 RX ADMIN — CEFAZOLIN 2 G: 1 INJECTION, POWDER, FOR SOLUTION INTRAMUSCULAR; INTRAVENOUS at 16:07

## 2021-07-14 RX ADMIN — FENTANYL CITRATE 25 MCG: 50 INJECTION, SOLUTION INTRAMUSCULAR; INTRAVENOUS at 07:31

## 2021-07-14 RX ADMIN — MIRTAZAPINE 45 MG: 15 TABLET, FILM COATED ORAL at 20:25

## 2021-07-14 RX ADMIN — LIDOCAINE HYDROCHLORIDE 40 MG: 20 INJECTION, SOLUTION EPIDURAL; INFILTRATION; INTRACAUDAL; PERINEURAL at 07:31

## 2021-07-14 RX ADMIN — HEPARIN SODIUM 3000 UNITS: 1000 INJECTION, SOLUTION INTRAVENOUS; SUBCUTANEOUS at 08:37

## 2021-07-14 RX ADMIN — PHENYLEPHRINE HYDROCHLORIDE 10 MCG/MIN: 10 INJECTION INTRAVENOUS at 07:56

## 2021-07-14 RX ADMIN — PROPOFOL 30 MG: 10 INJECTION, EMULSION INTRAVENOUS at 07:31

## 2021-07-14 RX ADMIN — CEFAZOLIN 2 G: 1 INJECTION, POWDER, FOR SOLUTION INTRAMUSCULAR; INTRAVENOUS at 23:57

## 2021-07-14 RX ADMIN — KETAMINE HYDROCHLORIDE 20 MG: 10 INJECTION, SOLUTION INTRAMUSCULAR; INTRAVENOUS at 08:10

## 2021-07-14 RX ADMIN — KETAMINE HYDROCHLORIDE 30 MG: 10 INJECTION, SOLUTION INTRAMUSCULAR; INTRAVENOUS at 07:31

## 2021-07-14 RX ADMIN — SODIUM CHLORIDE 1.9 UNITS/HR: 9 INJECTION, SOLUTION INTRAVENOUS at 07:48

## 2021-07-14 RX ADMIN — SODIUM CHLORIDE, POTASSIUM CHLORIDE, SODIUM LACTATE AND CALCIUM CHLORIDE: 600; 310; 30; 20 INJECTION, SOLUTION INTRAVENOUS at 07:24

## 2021-07-14 RX ADMIN — SODIUM CHLORIDE 10 ML/HR: 4.5 INJECTION, SOLUTION INTRAVENOUS at 10:30

## 2021-07-14 RX ADMIN — SODIUM CHLORIDE: 900 INJECTION, SOLUTION INTRAVENOUS at 07:24

## 2021-07-14 RX ADMIN — TAMSULOSIN HYDROCHLORIDE 0.4 MG: 0.4 CAPSULE ORAL at 21:12

## 2021-07-14 RX ADMIN — PROTAMINE SULFATE 130 MG: 10 INJECTION, SOLUTION INTRAVENOUS at 09:08

## 2021-07-14 RX ADMIN — ACETAMINOPHEN 650 MG: 325 TABLET ORAL at 19:14

## 2021-07-14 RX ADMIN — Medication 6 MG: at 21:12

## 2021-07-14 RX ADMIN — Medication 10 ML: at 14:57

## 2021-07-14 RX ADMIN — SODIUM CHLORIDE 9 ML/HR: 9 INJECTION, SOLUTION INTRAVENOUS at 13:00

## 2021-07-14 RX ADMIN — ACETAMINOPHEN 650 MG: 325 TABLET ORAL at 23:57

## 2021-07-14 RX ADMIN — AMLODIPINE BESYLATE 5 MG: 5 TABLET ORAL at 16:07

## 2021-07-14 RX ADMIN — MIDAZOLAM 2 MG: 1 INJECTION INTRAMUSCULAR; INTRAVENOUS at 07:36

## 2021-07-14 RX ADMIN — PROPOFOL 50 MCG/KG/MIN: 10 INJECTION, EMULSION INTRAVENOUS at 07:31

## 2021-07-14 RX ADMIN — ONDANSETRON HYDROCHLORIDE 4 MG: 2 INJECTION, SOLUTION INTRAMUSCULAR; INTRAVENOUS at 07:40

## 2021-07-14 RX ADMIN — WATER 2 G: 1 INJECTION INTRAMUSCULAR; INTRAVENOUS; SUBCUTANEOUS at 07:58

## 2021-07-14 NOTE — OP NOTES
INTERVENTIONAL CARDIOLOGY OPERATIVE NOTE: TRANSFEMORAL MARY 3 TAVR    PREOPERATIVE DIAGNOSIS:  Symptomatic severe aortic valve stenosis    POSTOPERATIVE DIAGNOSIS:  Symptomatic severe aortic valve stenosis    PROCEDURES PERFORMED:   1. Implantation of catheter-delivered prosthetic aortic heart valve (29 mm Mary 3); percutaneous right femoral artery approach (CPT 47845). 2. Percutaneous left femoral artery access under fluoroscopic and ultrasound guidance. 3. Percutaneous right femoral artery access (CPT U1086696) under fluoroscopic and ultrasound guidance (CPT 61990). 4. Introduction of catheter aorta, bilateral (CPT 15729-33). 5. Aortoiliac arteriogram (CPT 63602). 6. Percutaneous left femoral venous access under fluoroscopic guidance. 7. Insertion of temporary transvenous pacemaker (CPT 63931)     CASE SUMMARY:  1. Successful percutaneous right transfemoral TAVR using a 29 mm Mary 3 THV  2. No in native conduction intra-operatively and immediately post-TAVR  3. No paravalvular leak  4. No vascular complications    CO-SURGEONS:   Dr. Fadi Choudhary    ASSISTING:  None    ANESTHESIA:   MAC    CLINICAL HISTORY:   Low Loyd is a 79 y.o. male with severe, symptomatic aortic stenosis. He was evaluated and determined to be intermediate risk for conventional aortic valve replacement surgery. As such, he has been consented for FDA-approved commercial TAVR use, and is now taken to the operating room for endovascular transcatheter aortic valve replacement. The risks of the procedure including death, stroke, vascular injury, the need for conversion to open surgery, transfusion and the need for permanent pacemaker were discussed with the patient and his family in detail. He signed informed consent. Surgical priority is elective.      DESCRIPTION OF PROCEDURE:   After informed consent was obtained, the patient was brought to the operating room and positioned supine on the hybrid OR table. Prophylactic antibiotics were administered preoperatively. Invasive monitoring lines were placed by the cardiothoracic anesthesia team. The patient was prepped and draped from the chin to mid-thighs. Surgical time-out was performed. Under ultrasound guidance, the right common femoral artery is accessed percutaneously and a #6-Nepalese sheath is placed. A J-tipped wire is placed in the descending thoracic aorta. Two separate Perclose devices are deployed for pre-closure. Over an Amplatz super stiff wire with a hand-fashioned J-tip the #6-Nepalese sheath is exchanged for a #16-Nepalese E-sheath. The sheath is flushed and then sewn into place. The left common femoral artery is accessed via micropuncture technique with radiographic and ultrasound guidance and a #6-Nepalese sheath is placed there. The left femoral vein is accessed percutaneously, and a #6-Nepalese venous sheath is placed under fluoroscopic guidance there. The patient is systemically heparinized with 100 units per kg of IV heparin to a goal ACT greater than 250. A transvenous pacing lead is advanced from the left femoral venous sheath up to the right ventricle. The pacemaker is tested and has good pacing capture at <1 mA. A pigtail catheter is advanced via the left femoral artery sheath over a J-tipped wire and positioned in the right-coronary sinus of Valsalva. Several thoracic aortograms are obtained to determine the coplanar angle for valve implantation. Calcification of the aortic valve leaflets is evident. The aortic valve is then crossed using an AL-1 catheter and an 0.035\" straight tip guidewire. Simultaneous LV and ascending aortic pressures are recorded. A Confida 0.035\" guidewire is positioned in the LV apex. Care is taken to avoid contact with the ventricular wall by the transition point of the wire to avoid LV perforation. A balloon aortic valvuloplasty is not performed.     Next, a 29 mm Mary 3 device is advanced via the #16-Slovak E-sheath and positioned in the descending aorta. The valve is mounted onto the balloon. The camera is then placed Urdu and the valve is carefully advanced across the aortic arch while applying flexion to the delivery system to prevent trauma to the aortic arch. The camera is then placed back into the coplanar deployment angle and the valve is positioned across the aortic valve. A thoracic aortogram is obtained to correctly position the valve across the native aortic valve. Under rapid ventricular pacing at 200 beats per minute, the valve is carefully and deliberately deployed with nominal volume and seats in a good position at a depth of 95 aortic / 5 ventricular. Transthoracic echocardiography shows excellent valve position with no significant paravalvular or valvular aortic insufficiency. After deployment, transvalvular gradient is measured and is minimal. The left ventricular end-diastolic pressure is similar from predeployment, with a well preserved end-diastolic pressure gradient comparing the central aortic diastolic pressure simultaneously with the LVEDP, suggesting physiologically insignificant aortic insufficiency. Thoracic aortography is performed and shows no evidence for significant aortic insufficiency. Satisfied with the valve position and function, we turn our attention to the access sites. The #16-Slovak E-sheath on the right is removed and the two Perclose devices deployed. There is brisk arterial bleeding after deployment of the two Perclose devices. A 3rd Perclose device is deployed with excellent hemostasis. Next, the pigtail catheter is pulled down into the abdominal aorta and an iliofemoral pelvic arteriogram is taken. This demonstrates good flow bilaterally without dissection. There is an excellent pulse in the artery after repair completion.      The #6-Slovak left femoral arterial sheath is removed and successful hemostasis is obtained with a #6-Indian AngioSeal.    The #6-Indian left femoral venous sheath is removed and successful hemostasis is obtained with manual compression in the hybrid OR per standard protocol.     Protamine is administered and hemostasis is obtained. The intraoperative ECG monitoring at the end of the case shows NSR with a narrow QRS. No high degree AV block is present. Estimated blood loss: < 30 cc     Complications: None    Disposition: PACU --> CVSU    All sponge, needle, and instrument counts are reported correct at the end of the case. The patient is taken to the PACU in stable condition at the end of procedure. Dr. Salinas Stevens and KIKI jointly performed the procedure and all of the critical components. The immediate results of the valve surgery were discussed with the patient's family. I, as co-surgeon, was scrubbed and present for the entire procedure.      Fadi Herzog.  Hanh Echevarria, [address] [place] [address] [facility]  07/14/21

## 2021-07-14 NOTE — Clinical Note
Sheath #2: Sheath: inserted. Sheath inserted/placed in the left femoral  vein. Hemostasis not achieved. Upon evaluation of the common femoral artery stick using fluoroscopy, the access site puncture was within the safe zone.  4FR MICRO UPSIZED TO [id number] SHEATH

## 2021-07-14 NOTE — Clinical Note
Sheath #3: Sheath: removed. Upon evaluation of the common femoral artery stick using fluoroscopy, the access site puncture was within the safe zone.  DELIVERING PERCLOSES (2) TO RIGHT FEMORAL ARTERY

## 2021-07-14 NOTE — ANESTHESIA PREPROCEDURE EVALUATION
Relevant Problems   No relevant active problems       Anesthetic History   No history of anesthetic complications            Review of Systems / Medical History  Patient summary reviewed, nursing notes reviewed and pertinent labs reviewed    Pulmonary  Within defined limits                 Neuro/Psych   Within defined limits           Cardiovascular  Within defined limits            PAD and hyperlipidemia    Exercise tolerance: >4 METS     GI/Hepatic/Renal  Within defined limits              Endo/Other  Within defined limits  Diabetes         Other Findings              Physical Exam    Airway  Mallampati: II  TM Distance: > 6 cm  Neck ROM: normal range of motion   Mouth opening: Normal     Cardiovascular  Regular rate and rhythm,  S1 and S2 normal,  no murmur, click, rub, or gallop        Murmur: Grade 3     Dental  No notable dental hx       Pulmonary  Breath sounds clear to auscultation               Abdominal  GI exam deferred       Other Findings            Anesthetic Plan    ASA: 3  Anesthesia type: general

## 2021-07-14 NOTE — Clinical Note
Sheath #1: Sheath: inserted. Sheath inserted/placed in the left femoral  artery. Hemostasis not achieved. Upon evaluation of the common femoral artery stick using fluoroscopy, the access site puncture was within the safe zone.  4FR MICRO UPSIZED TO 5FR SHEATH

## 2021-07-14 NOTE — PROGRESS NOTES
1530- bedside report and drip verified. Patient in bed without complaints. 1618- cardene off; BP quickly responded to norvasc and now 82/51 (62), patient complaining of feeling nausea with sudden change in BP.    1820- 11 beat run of VT. Patient asymptomatic, VSS; 2 assist back to bed for safety. STAT K/Mg and H&H sent per protocol. 1945- Bedside and Verbal shift change report given to [facility] (oncoming nurse) by Marcia Medrano RN (offgoing nurse).  Report included the following information SBAR, Kardex, Recent Results and Cardiac Rhythm SR.

## 2021-07-14 NOTE — PROGRESS NOTES
1200 Report from Edythe Portal I., RN - SBAR, OR summary, MAR, recent results, cardiac rhythm - NSR; gtts/lines verified. Assumed care of patient. Upon assessment, noted left groin dressing CDI, soft; new drainage to right groin site, edges marked. Bilateral pulses palpable    1230 Noted new drainage to right groin; dressing removed, handheld pressure and quikclot to site  1250 Hemostasis achieved; dressing CDI, soft to palpate  1425 Started on 5mg Cardene for SBP 150s    1515 Bedside shift change report given to Eliud Bautista RN (oncoming nurse) by Paulina Favre, RN (offgoing nurse). Report included the following information SBAR, Kardex, ED Summary, Procedure Summary, MAR, Recent Results and Cardiac Rhythm Paced.

## 2021-07-14 NOTE — Clinical Note
Temporary pacemaker inserted and tested. Inserted through the left groin. Device secured using: tegaderm. Rate = 90 bpm.   Electrical capture obtained.  THRESHOLD 0.3

## 2021-07-14 NOTE — Clinical Note
Sheath #3: Sheath: upsized Hemostasis not achieved. Upon evaluation of the common femoral artery stick using fluoroscopy, the access site puncture was within the safe zone.

## 2021-07-14 NOTE — ANESTHESIA POSTPROCEDURE EVALUATION
Procedure(s):  Transcatheter Aortic Valve Replacement 29 S3, Transthoracic Echo. MAC    Anesthesia Post Evaluation        Patient location during evaluation: PACU  Note status: Adequate. Level of consciousness: responsive to verbal stimuli and sleepy but conscious  Pain management: satisfactory to patient  Airway patency: patent  Anesthetic complications: no  Cardiovascular status: acceptable  Respiratory status: acceptable  Hydration status: acceptable  Comments: +Post-Anesthesia Evaluation and Assessment    Patient: Christy Calle MRN: [id number]  SSN: [id number]   YOB: 1950  Age: 79 y.o. Sex: male          Cardiovascular Function/Vital Signs    /78   Pulse 64   Temp 36.2 °C (97.2 °F)   Resp 24   Ht 5' 10\" (1.778 m)   Wt 98.4 kg (217 lb)   SpO2 94%   BMI 31.14 kg/m²     Patient is status post Procedure(s):  Transcatheter Aortic Valve Replacement 29 S3, Transthoracic Echo. Nausea/Vomiting: Controlled. Postoperative hydration reviewed and adequate. Pain:  Pain Scale 1: Numeric (0 - 10) (07/14/21 5524)  Pain Intensity 1: 0 (07/14/21 5300)   Managed. Neurological Status: At baseline. Mental Status and Level of Consciousness: Arousable. Pulmonary Status:   O2 Device: CO2 nasal cannula;Nasal airway (07/14/21 0948)   Adequate oxygenation and airway patent. Complications related to anesthesia: None    Post-anesthesia assessment completed. No concerns. I have evaluated the patient and the patient is stable and ready to be discharged from PACU . Signed By: Josr Pham MD    7/14/2021        INITIAL Post-op Vital signs:   Vitals Value Taken Time   /84 07/14/21 0955   Temp 36.2 °C (97.2 °F) 07/14/21 0948   Pulse 58 07/14/21 1000   Resp 15 07/14/21 1000   SpO2 93 % 07/14/21 1000   Vitals shown include unvalidated device data.

## 2021-07-14 NOTE — PERIOP NOTES
1005: Xray at bedside. 1008: EKG tech at bedside. 1045: Called and updated patient's wife, Jan Moreland, on plan of care. All questions answered. Will call wife again once patient ready to transfer to floor. 1130: TRANSFER - OUT REPORT:    Verbal report given to Adam Laurent RN (name) on Camilla Renee  being transferred to [facility] (unit) for routine post - op       Report consisted of patients Situation, Background, Assessment and   Recommendations(SBAR). Time Pre op antibiotic given: 0656  Anesthesia Stop time:  0950  Teran Present on Transfer to floor: No  Order for Teran on Chart: N/A  Discharge Prescriptions with Chart: N/A    Information from the following report(s) OR Summary, Intake/Output, MAR, Recent Results, Med Rec Status and Cardiac Rhythm SR was reviewed with the receiving nurse. Opportunity for questions and clarification was provided. Is the patient on 02? YES       L/Min 4    Is the patient on a monitor? YES    Is the nurse transporting with the patient? YES    Surgical Waiting Area notified of patient's transfer from PACU? YES (Wife, Osei Cardozo)      The following personal items collected during your admission accompanied patient upon transfer:   Dental Appliance: Dental Appliances: None  Vision:    Hearing Aid:    Jewelry: Jewelry: None  Clothing:    Other Valuables: Other Valuables: None  Valuables sent to safe:       1138: Called wife and updated on patient condition. Wife directed to go to [facility] waiting room.

## 2021-07-14 NOTE — OP NOTES
PREOPERATIVE DIAGNOSIS:  Symptomatic severe aortic valve stenosis    POSTOPERATIVE DIAGNOSIS:  Symptomatic severe aortic valve stenosis    PROCEDURES PERFORMED:   1. Implantation of catheter-delivered prosthetic aortic heart valve (29 mm Mary 3); percutaneous right femoral artery approach (CPT 36905). 2. Percutaneous left femoral artery access under fluoroscopic and ultrasound guidance. 3. Percutaneous right femoral artery access (CPT F4740671) under fluoroscopic and ultrasound guidance (CPT 45820). 4. Introduction of catheter aorta, bilateral (CPT 22433-17). 5. Aortoiliac arteriogram (CPT 94352). 6. Percutaneous left femoral venous access under fluoroscopic guidance. 7. Insertion of temporary transvenous pacemaker (CPT 73513)     CASE SUMMARY:  1. Successful percutaneous right transfemoral TAVR using a 29 mm Mary 3 THV  2. No in native conduction intra-operatively and immediately post-TAVR  3. No paravalvular leak  4. No vascular complications    CO-SURGEONS:   Dr. Lorri Morris. Raisa Joyce    ASSISTING:  None    ANESTHESIA:   MAC    CLINICAL HISTORY:   Madiha Rosas is a 79 y. o. male with severe, symptomatic aortic stenosis. He was evaluated and determined to be intermediate risk for conventional aortic valve replacement surgery. As such, he has been consented for FDA-approved commercial TAVR use, and is now taken to the operating room for endovascular transcatheter aortic valve replacement. The risks of the procedure including death, stroke, vascular injury, the need for conversion to open surgery, transfusion and the need for permanent pacemaker were discussed with the patient and his family in detail. [facility] informed consent. Surgical priority is elective. DESCRIPTION OF PROCEDURE:   After informed consent was obtained, the patient was brought to the operating room and positioned supine on the hybrid OR table. Prophylactic antibiotics were administered preoperatively. Invasive monitoring lines were placed by the cardiothoracic anesthesia team. The patient was prepped and draped from the chin to mid-thighs. Surgical time-out was performed. Under ultrasound guidance, the right common femoral artery is accessed percutaneously and a #6-Libyan sheath is placed. A J-tipped wire is placed in the descending thoracic aorta. Two separate Perclose devices are deployed for pre-closure. Over an Amplatz super stiff wire with a hand-fashioned J-tip the #6-Libyan sheath is exchanged for a #16-Libyan E-sheath. The sheath is flushed and then sewn into place. The left common femoral artery is accessed via micropuncture technique with radiographic and ultrasound guidance and a #6-Libyan sheath is placed there. The left femoral vein is accessed percutaneously, and a #6-Libyan venous sheath is placed under fluoroscopic guidance there. The patient is systemically heparinized with 100 units per kg of IV heparin to a goal ACT greater than 250. A transvenous pacing lead is advanced from the left femoral venous sheath up to the right ventricle. The pacemaker is tested and has good pacing capture at <1 mA. A pigtail catheter is advanced via the left femoral artery sheath over a J-tipped wire and positioned in the right-coronary sinus of Valsalva. Several thoracic aortograms are obtained to determine the coplanar angle for valve implantation. Calcification of the aortic valve leaflets is evident. The aortic valve is then crossed using an AL-1 catheter and an 0.035\" straight tip guidewire. Simultaneous LV and ascending aortic pressures are recorded. A Confida 0.035\" guidewire is positioned in the LV apex. Care is taken to avoid contact with the ventricular wall by the transition point of the wire to avoid LV perforation. A balloon aortic valvuloplasty is not performed.     Next, a 29 mm Mary 3 device is advanced via the #16-Libyan E-sheath and positioned in the descending aorta. The valve is mounted onto the balloon. The camera is then placed BEATRIZ and the valve is carefully advanced across the aortic arch while applying flexion to the delivery system to prevent trauma to the aortic arch. The camera is then placed back into the coplanar deployment angle and the valve is positioned across the aortic valve. A thoracic aortogram is obtained to correctly position the valve across the native aortic valve. Under rapid ventricular pacing at 200 beats per minute, the valve is carefully and deliberately deployed with nominal volume and seats in a good position at a depth of 95 aortic / 5 ventricular. Transthoracic echocardiography shows excellent valve position with no significant paravalvular or valvular aortic insufficiency. After deployment, transvalvular gradient is measured and is minimal. The left ventricular end-diastolic pressure is similar from predeployment, with a well preserved end-diastolic pressure gradient comparing the central aortic diastolic pressure simultaneously with the LVEDP, suggesting physiologically insignificant aortic insufficiency. Thoracic aortography is performed and shows no evidence for significant aortic insufficiency. Satisfied with the valve position and function, we turn our attention to the access sites. The #16-Zambian E-sheath on the right is removed and the two Perclose devices deployed. There is brisk arterial bleeding after deployment of the two Perclose devices.  A 3rd Perclose device is deployed with excellent hemostasis.  Next, the pigtail catheter is pulled down into the abdominal aorta and an iliofemoral pelvic arteriogram is taken. This demonstrates good flow bilaterally without dissection. There is an excellent pulse in the artery after repair completion.      The #6-Zambian left femoral arterial sheath is removed and successful hemostasis is obtained with a #6-Zambian AngioSeal.    The #6-Zambian left femoral venous sheath is removed and successful hemostasis is obtained with manual compression in the hybrid OR per standard protocol.     Protamine is administered and hemostasis is obtained. The intraoperative ECG monitoring at the end of the case shows NSR with a narrow QRS. No high degree AV block is present. Estimated blood loss: < 44 cc     Complications: None    Disposition: PACU --> CVSU    All sponge, needle, and instrument counts are reported correct at the end of the case. The patient is taken to the PACU in stable condition at the end of procedure.

## 2021-07-14 NOTE — Clinical Note
Sheath #3: Sheath: removed. Hemostasis achieved. Upon evaluation of the common femoral artery stick using fluoroscopy, the access site puncture was within the safe zone.  16FR SHEATH REMOVED. 2 PERCLOSES ARE BEING FULLY DEPLOYED TO OBTAIN HEMOSTASIS

## 2021-07-14 NOTE — PROGRESS NOTES
Clinical Update:      Bette Coello arrived to PACU from OR s/p transcatheter aortic valve replacement with 29 mm S3 via the right TF approach by Jamari Johnson. he  had MAC anesthesia. Patient is groggy but wakes to speech. Bilateral groin sites soft with gauze dressing-clean, dry, intact. PPP. Anticoagulation plan for ASA. Plan for postoperative TTE and EKG.     Visit Vitals  /85   Pulse (!) 58   Temp 97.2 °F (36.2 °C)   Resp 15   Ht 5' 10\" (1.778 m)   Wt 217 lb (98.4 kg)   SpO2 93%   BMI 31.14 kg/m²         Signed:  Alfonzo Meza NP  7/14/2021  10:21 AM

## 2021-07-14 NOTE — H&P
Date of Surgery Update:        Eliza Villa  was seen and examined. History and physical has been reviewed. The patient has been examined.  There have been no significant clinical changes since the completion of the originally dated History and Physical.     Ludwig Cool NP  07/14/21  6:52 AM

## 2021-07-14 NOTE — ANESTHESIA PROCEDURE NOTES
Arterial Line Placement    Start time: 7/14/2021 7:11 AM  End time: 7/14/2021 7:21 AM  Performed by: Amara Jaimes MD  Authorized by: Amara Jaimes MD     Pre-Procedure  Indications:  Arterial pressure monitoring and blood sampling  Preanesthetic Checklist: patient identified, risks and benefits discussed, anesthesia consent, site marked, patient being monitored, timeout performed and patient being monitored      Procedure:   Prep:  Chlorhexidine  Seldinger Technique?: Yes    Orientation:  Right  Location:  Radial artery  Catheter size:  20 G  Number of attempts:  1    Assessment:   Post-procedure:  Line secured and sterile dressing applied  Patient Tolerance:  Patient tolerated the procedure well with no immediate complications

## 2021-07-14 NOTE — Clinical Note
Sheath #3: Sheath: inserted. Sheath inserted/placed in the right femoral  artery. Hemostasis not achieved. Upon evaluation of the common femoral artery stick using fluoroscopy, the access site puncture was within the safe zone.  4FR MICRO UPSIZED TO 6FR SHEATH

## 2021-07-15 ENCOUNTER — APPOINTMENT (OUTPATIENT)
Dept: NON INVASIVE DIAGNOSTICS | Age: 71
DRG: 267 | End: 2021-07-15
Attending: NURSE PRACTITIONER
Payer: MEDICARE

## 2021-07-15 VITALS
OXYGEN SATURATION: 96 % | DIASTOLIC BLOOD PRESSURE: 80 MMHG | BODY MASS INDEX: 30.78 KG/M2 | HEIGHT: 70 IN | HEART RATE: 79 BPM | TEMPERATURE: 98.2 F | SYSTOLIC BLOOD PRESSURE: 139 MMHG | RESPIRATION RATE: 24 BRPM | WEIGHT: 215 LBS

## 2021-07-15 LAB
ABO + RH BLD: NORMAL
ANION GAP SERPL CALC-SCNC: 7 MMOL/L (ref 5–15)
BLD PROD TYP BPU: NORMAL
BLOOD GROUP ANTIBODIES SERPL: NORMAL
BPU ID: NORMAL
BUN SERPL-MCNC: 22 MG/DL (ref 6–20)
BUN/CREAT SERPL: 16 (ref 12–20)
CALCIUM SERPL-MCNC: 9.2 MG/DL (ref 8.5–10.1)
CHLORIDE SERPL-SCNC: 104 MMOL/L (ref 97–108)
CO2 SERPL-SCNC: 27 MMOL/L (ref 21–32)
CREAT SERPL-MCNC: 1.35 MG/DL (ref 0.7–1.3)
CROSSMATCH RESULT,%XM: NORMAL
ECHO AO ROOT DIAM: 3.15 CM
ECHO AV AREA PEAK VELOCITY: 1.95 CM2
ECHO AV AREA VTI: 2.33 CM2
ECHO AV AREA/BSA PEAK VELOCITY: 0.9 CM2/M2
ECHO AV AREA/BSA VTI: 1.1 CM2/M2
ECHO AV MEAN GRADIENT: 22.09 MMHG
ECHO AV PEAK GRADIENT: 33.29 MMHG
ECHO AV PEAK VELOCITY: 288.49 CM/S
ECHO AV VTI: 45.11 CM
ECHO EST RA PRESSURE: 3 MMHG
ECHO LA AREA 4C: 29.26 CM2
ECHO LA MAJOR AXIS: 4.25 CM
ECHO LA MINOR AXIS: 1.98 CM
ECHO LA VOL 2C: 100.47 ML (ref 18–58)
ECHO LA VOL 4C: 98.46 ML (ref 18–58)
ECHO LA VOL BP: 107.29 ML (ref 18–58)
ECHO LA VOL/BSA BIPLANE: 49.9 ML/M2 (ref 16–28)
ECHO LA VOLUME INDEX A2C: 46.73 ML/M2 (ref 16–28)
ECHO LA VOLUME INDEX A4C: 45.8 ML/M2 (ref 16–28)
ECHO LV INTERNAL DIMENSION DIASTOLIC: 4.71 CM (ref 4.2–5.9)
ECHO LV INTERNAL DIMENSION SYSTOLIC: 3.49 CM
ECHO LV IVSD: 1.39 CM (ref 0.6–1)
ECHO LV MASS 2D: 264.7 G (ref 88–224)
ECHO LV MASS INDEX 2D: 123.1 G/M2 (ref 49–115)
ECHO LV POSTERIOR WALL DIASTOLIC: 1.4 CM (ref 0.6–1)
ECHO LVOT DIAM: 2.44 CM
ECHO LVOT PEAK GRADIENT: 5.82 MMHG
ECHO LVOT PEAK VELOCITY: 120.62 CM/S
ECHO LVOT SV: 105.3 ML
ECHO LVOT VTI: 22.57 CM
ECHO MV A VELOCITY: 103.73 CM/S
ECHO MV AREA PHT: 2.37 CM2
ECHO MV E DECELERATION TIME (DT): 319.96 MS
ECHO MV E VELOCITY: 94.34 CM/S
ECHO MV E/A RATIO: 0.91
ECHO MV PRESSURE HALF TIME (PHT): 92.79 MS
ECHO PV PEAK INSTANTANEOUS GRADIENT SYSTOLIC: 4.92 MMHG
ECHO RIGHT VENTRICULAR SYSTOLIC PRESSURE (RVSP): 12.98 MMHG
ECHO RV INTERNAL DIMENSION: 4.98 CM
ECHO RV TAPSE: 2.66 CM (ref 1.5–2)
ECHO TV REGURGITANT MAX VELOCITY: 157.99 CM/S
ECHO TV REGURGITANT PEAK GRADIENT: 9.98 MMHG
ERYTHROCYTE [DISTWIDTH] IN BLOOD BY AUTOMATED COUNT: 13.5 % (ref 11.5–14.5)
GLUCOSE SERPL-MCNC: 123 MG/DL (ref 65–100)
HCT VFR BLD AUTO: 45.5 % (ref 36.6–50.3)
HGB BLD-MCNC: 15 G/DL (ref 12.1–17)
MCH RBC QN AUTO: 31.6 PG (ref 26–34)
MCHC RBC AUTO-ENTMCNC: 33 G/DL (ref 30–36.5)
MCV RBC AUTO: 96 FL (ref 80–99)
NRBC # BLD: 0 K/UL (ref 0–0.01)
NRBC BLD-RTO: 0 PER 100 WBC
PLATELET # BLD AUTO: 151 K/UL (ref 150–400)
PMV BLD AUTO: 10.8 FL (ref 8.9–12.9)
POTASSIUM SERPL-SCNC: 3.9 MMOL/L (ref 3.5–5.1)
RBC # BLD AUTO: 4.74 M/UL (ref 4.1–5.7)
SODIUM SERPL-SCNC: 138 MMOL/L (ref 136–145)
SPECIMEN EXP DATE BLD: NORMAL
STATUS OF UNIT,%ST: NORMAL
UNIT DIVISION, %UDIV: 0
WBC # BLD AUTO: 13.7 K/UL (ref 4.1–11.1)

## 2021-07-15 PROCEDURE — 80048 BASIC METABOLIC PNL TOTAL CA: CPT

## 2021-07-15 PROCEDURE — 74011250637 HC RX REV CODE- 250/637: Performed by: NURSE PRACTITIONER

## 2021-07-15 PROCEDURE — 93306 TTE W/DOPPLER COMPLETE: CPT

## 2021-07-15 PROCEDURE — 74011250636 HC RX REV CODE- 250/636: Performed by: NURSE PRACTITIONER

## 2021-07-15 PROCEDURE — 85027 COMPLETE CBC AUTOMATED: CPT

## 2021-07-15 PROCEDURE — 36415 COLL VENOUS BLD VENIPUNCTURE: CPT

## 2021-07-15 PROCEDURE — 74011000250 HC RX REV CODE- 250: Performed by: NURSE PRACTITIONER

## 2021-07-15 PROCEDURE — P9045 ALBUMIN (HUMAN), 5%, 250 ML: HCPCS | Performed by: NURSE PRACTITIONER

## 2021-07-15 PROCEDURE — 93005 ELECTROCARDIOGRAM TRACING: CPT

## 2021-07-15 RX ORDER — AMLODIPINE BESYLATE 5 MG/1
5 TABLET ORAL DAILY
Qty: 90 TABLET | Refills: 0 | Status: SHIPPED | OUTPATIENT
Start: 2021-07-16

## 2021-07-15 RX ORDER — ACETAMINOPHEN 325 MG/1
650 TABLET ORAL
Status: SHIPPED | COMMUNITY
Start: 2021-07-15

## 2021-07-15 RX ORDER — DEXTROSE 50 % IN WATER (D50W) INTRAVENOUS SYRINGE
12.5-25 AS NEEDED
Status: DISCONTINUED | OUTPATIENT
Start: 2021-07-15 | End: 2021-07-15 | Stop reason: HOSPADM

## 2021-07-15 RX ORDER — INSULIN LISPRO 100 [IU]/ML
INJECTION, SOLUTION INTRAVENOUS; SUBCUTANEOUS
Status: DISCONTINUED | OUTPATIENT
Start: 2021-07-15 | End: 2021-07-15 | Stop reason: HOSPADM

## 2021-07-15 RX ORDER — ALBUMIN HUMAN 50 G/1000ML
12.5 SOLUTION INTRAVENOUS ONCE
Status: COMPLETED | OUTPATIENT
Start: 2021-07-15 | End: 2021-07-15

## 2021-07-15 RX ORDER — MAGNESIUM SULFATE 100 %
4 CRYSTALS MISCELLANEOUS AS NEEDED
Status: DISCONTINUED | OUTPATIENT
Start: 2021-07-15 | End: 2021-07-15 | Stop reason: HOSPADM

## 2021-07-15 RX ADMIN — ALBUMIN (HUMAN) 12.5 G: 12.5 INJECTION, SOLUTION INTRAVENOUS at 11:02

## 2021-07-15 RX ADMIN — ASPIRIN 81 MG: 81 TABLET, CHEWABLE ORAL at 09:00

## 2021-07-15 RX ADMIN — CEFAZOLIN 2 G: 1 INJECTION, POWDER, FOR SOLUTION INTRAMUSCULAR; INTRAVENOUS at 09:01

## 2021-07-15 RX ADMIN — Medication 10 ML: at 05:51

## 2021-07-15 RX ADMIN — DOCUSATE SODIUM 50 MG AND SENNOSIDES 8.6 MG 1 TABLET: 8.6; 5 TABLET, FILM COATED ORAL at 09:00

## 2021-07-15 RX ADMIN — Medication 400 MG: at 09:00

## 2021-07-15 RX ADMIN — AMLODIPINE BESYLATE 5 MG: 5 TABLET ORAL at 09:00

## 2021-07-15 RX ADMIN — FLUOXETINE 20 MG: 20 CAPSULE ORAL at 09:01

## 2021-07-15 RX ADMIN — FAMOTIDINE 20 MG: 20 TABLET, FILM COATED ORAL at 09:00

## 2021-07-15 NOTE — PROGRESS NOTES
2000: Received report from Jacob Wilson [facility]. Assumed care of pt.     2030: Pt would prefer to take alfuzosin over flomax, Dr. Nancy Duffy paged to clarify order. Orders received to give alfuzosin if pharmacy has on formulary and can perform Q4 VS overnight. Orders received to give 6 mg melatonin QHS. 2035: Pharmacist on telephone, will substitute alfuzosin order with flomax per availability. Discussed with pt, agreeable to take one dose flomax tonight and will bring in home alfuzosin tomorrow if still hospitalized. 0100: Bedside and Verbal shift change report given to [address] (oncoming nurse) by Sherman Ann (offgoing nurse). Report included the following information SBAR, OR Summary, Procedure Summary, Intake/Output, Recent Results, Cardiac Rhythm NSR and Alarm Parameters .

## 2021-07-15 NOTE — PROGRESS NOTES
0800- bedside report received. Patient awake in bed without complaints. 2396- standby assist to chair. Dr. Skyler Esparza and Gina Pierre, ISELA at bedside for rounds. No orders at this time.

## 2021-07-15 NOTE — PROGRESS NOTES
[facility] Cardiology Progress Note    Date of Service: 7/15/2021    Subjective:  No acute events overnight. Denies chest pain, dyspnea, lightheadedness, syncope. ECG computer read is iRBBB, however, this is not present. There are new non-specific TWI in lateral precordial leads, V5-V6, improved compared with immediately post-TAVR.     Objective:    Visit Vitals  /80 (BP 1 Location: Right upper arm, BP Patient Position: At rest)   Pulse 79   Temp 98.2 °F (36.8 °C)   Resp 24   Ht 5' 10\" (1.778 m)   Wt 97.7 kg (215 lb 4.8 oz)   SpO2 96%   BMI 30.89 kg/m²         Intake/Output Summary (Last 24 hours) at 7/15/2021 1046  Last data filed at 7/15/2021 0000  Gross per 24 hour   Intake 1944.41 ml   Output 1500 ml   Net 444.41 ml        Physical Exam  GEN: NAD, appears stated age  HEENT: EOMI, MMM, OP clear  NECK: Normal JVP  CV: RRR, normal S1 and S2, I/VI systolic flow murmur at LUSB  LUNGS: CTAB, no W/R/R  ABD: NABS, soft, NT/ND  EXT: No edema, 2+ distal pulses  PSYCH: Mood and affect normal  NEURO: AAO, MAEW, face symmetrical, speech intact    Current Facility-Administered Medications   Medication Dose Route Frequency    insulin lispro (HUMALOG) injection   SubCUTAneous AC&HS    glucose chewable tablet 16 g  4 Tablet Oral PRN    dextrose (D50W) injection syrg 12.5-25 g  12.5-25 g IntraVENous PRN    glucagon (GLUCAGEN) injection 1 mg  1 mg IntraMUSCular PRN    albumin human 5% (BUMINATE) solution 12.5 g  12.5 g IntraVENous ONCE    sodium chloride (NS) flush 5-40 mL  5-40 mL IntraVENous Q8H    FLUoxetine (PROzac) capsule 20 mg  20 mg Oral DAILY    mirtazapine (REMERON) tablet 45 mg  45 mg Oral QHS    tamsulosin (FLOMAX) capsule 0.4 mg  0.4 mg Oral DAILY    sodium chloride (NS) flush 5-40 mL  5-40 mL IntraVENous Q8H    sodium chloride (NS) flush 5-40 mL  5-40 mL IntraVENous PRN    acetaminophen (TYLENOL) tablet 650 mg  650 mg Oral Q4H PRN    traMADoL (ULTRAM) tablet  mg   mg Oral Q6H PRN    oxyCODONE-acetaminophen (PERCOCET) 5-325 mg per tablet 1-2 Tablet  1-2 Tablet Oral Q4H PRN    morphine injection 2 mg  2 mg IntraVENous Q2H PRN    naloxone (NARCAN) injection 0.4 mg  0.4 mg IntraVENous PRN    ondansetron (ZOFRAN) injection 4 mg  4 mg IntraVENous Q4H PRN    albuterol (PROVENTIL VENTOLIN) nebulizer solution 2.5 mg  2.5 mg Nebulization Q4H PRN    aspirin chewable tablet 81 mg  81 mg Oral DAILY    magnesium oxide (MAG-OX) tablet 400 mg  400 mg Oral BID    bisacodyL (DULCOLAX) suppository 10 mg  10 mg Rectal DAILY PRN    senna-docusate (PERICOLACE) 8.6-50 mg per tablet 1 Tablet  1 Tablet Oral BID    ELECTROLYTE REPLACEMENT NOTE: Nurse to review Serum Potassium and Magnesuim levels and Initiate Electrolyte Replacement Protocol as needed  1 Each Other PRN    famotidine (PEPCID) tablet 20 mg  20 mg Oral Q12H    amLODIPine (NORVASC) tablet 5 mg  5 mg Oral DAILY    hydrALAZINE (APRESOLINE) 20 mg/mL injection 10 mg  10 mg IntraVENous Q6H PRN    melatonin tablet 6 mg  6 mg Oral QHS       Data Reviewed:  Recent Labs     07/15/21  0516 07/14/21  1829 07/14/21  0944 07/12/21  1110 07/12/21  1110     --  139  --  138   K 3.9 4.0 4.3   < > 3.7     --  109*  --  106   CO2 27  --  27  --  27   *  --  134*  --  145*   BUN 22*  --  16  --  16   CREA 1.35*  --  1.07  --  1.15   CA 9.2  --  8.7  --  9.3   MG  --  2.0 1.8  --  1.7   ALB  --   --  3.2*  --  3.6   ALT  --   --  41  --  44   INR  --   --  1.1  --  1.0    < > = values in this interval not displayed. Recent Labs     07/15/21  0516 07/14/21  1829 07/14/21  0944 07/12/21  1110 07/12/21  1110   WBC 13.7*  --  7.1  --  7.0   HGB 15.0 15.9 14.7   < > 15.8   HCT 45.5 48.1 44.8   < > 48.0     --  161  --  206    < > = values in this interval not displayed.      No results found for: SDES  No results found for: CULT    All Cardiac Markers in the last 24 hours:  No results found for: CPK, CK, CKMMB, CKMB, RCK3, CKMBT, CKMBPOC, CKNDX, CKND1, VIANNEY, TROPT, TROIQ, IWONA, TROPT, TNIPOC, BNP, BNPP, BNPNT    Telemetry (personally reviewed): normal sinus rhythm    Echocardiogram:  07/14/21    ECHO ADULT FOLLOW-UP OR LIMITED 07/14/2021 7/14/2021    Interpretation Summary  Limited intraoperative TTE to guide TAVR procedure. Well positioned THV with no paravalvular leak. No change in LV systolic function post-TAVR. Signed by: Burnette Nageotte, MD on 7/14/2021  3:38 PM      Assessment:  1. Symptomatic, severe aortic stenosis s/p successful percutaneous right transfemoral TAVR using a 29 mm Mary 3 transcatheter valve  2. DM2 with other circulatory complications  3. HL  4. Stage III CKD    Plan:  - PT/OT, incentive spirometry  - Continue aspirin 81 mg daily indefinitely  - SBE prophylaxis prior to dental procedures  - Repeat TTE at 1 month with follow-up with me at that time  - F/u long-term with me (primary cardiologist)  - Follow up with cardiothoracic surgery team in 1 week after discharge  - Discussed with Norris Stone    Medically stable for discharge to home today. Thank you for the opportunity to participate in the care of Purvi Samayoa and please do not hesitate to contact us should you have any questions. Signed:  Tyson Bernstein.  James Mccrary, [address] [place] [address] [facility]  07/15/21

## 2021-07-15 NOTE — DISCHARGE INSTRUCTIONS
Cardiac Surgery Specialist    [address] [place] [address]                                       [id number] [place], [place] [address] Road, [address] [facility]  Office- [phone]  Fax- [phone]       Office- [phone]  Fax- [phone]  _____________________________________________________________  Dr. Catherine Pascal, Terri Wood, NP  Dr. Dyan Mandujano, NP          Clemente Jimenez, NP  Dr. Sajan Mojica, NP          Jeffrey Ramires, [address] [place] [address], MANOLO Spann, MANOLO Dial PA-C  ____________________________________________________________     Name:Jose E Perez     Surgery & Date: Procedure(s):  Transcatheter Aortic Valve Replacement 29 S3, Transthoracic Echo    Discharge Date:  07/15/21     MEDICATIONS:  Please refer to your After Visit Summary for your medication list.       DO NOT TAKE ANY MEDICATIONS THAT ARE NOT ON THIS LIST    INSTRUCTIONS:  1. NO SMOKING OR TOBACCO PRODUCTS  2. Do not follow the activity/exercise instructions in your discharge book given to you as an inpatient. You have no activity restrictions. 3. You may shower. Wash all incisions twice daily with mild soap and water. No lotions, ointments or powder. 4. Call the office immediately for any redness, swelling, or drainage from your incision. 5. Take your temperature daily and call for a temperature of 101 degrees or higher or for any symptoms that make you think you have and infection. 6. Weigh yourself each morning. Call if you gain more than 5 pounds in 48 hours. 7. Use the incentive spirometer 6-8 times a day-10 breaths each time. 8. Walk several hundred feet several times daily. DIET  Eat an American Heart Association diet. If you are having trouble with your appetite, eat what you can.   Try eating small, frequent meals throughout the day. ACTIVITY  1. You have no activity restrictions. You may resume your daily activities at home, based on your comfort level. You may also drive. FOLLOW UP  1. Your first follow up appointment will be on 7/19/21 at 10 am by telephone. Our office is located in [address] [place] [address]. Your second follow up appointment will be in four weeks, on 8/10/21 at 2pm in the clinic. You will need to have an ECHO prior to your appointment time. Our office will set that up for you. Please call our office at [phone] if you are unable to make either one of these appointments. 2. You will be receiving a call before your 3 day follow up appointment to begin cardiac rehab. They are programs located at the [address] [place] [address].  The contact information is located in your Cardiac Surgery booklet. Please call if you have not been contacted 2-3 weeks after discharge from the hospital.  3. We will make an appointment for you with your cardiologist in 4-5 weeks. 4. Consult you primary care physician regarding your influenza &   pneumovax vaccines. 5.   Please bring all medications with you to your appointment.     Signature:___________________________________________________

## 2021-07-15 NOTE — PROGRESS NOTES
0100  Received report from [facility], [address] [facility]. Pt asleep at this time. 0500 Pt awake . Labs opbtained. Pt stated he had a burning sensation with Chlorhexadine therefore foam cleanser used for AM care  . 0800 Bedside, Verbal and Written shift change report given to  Kate Simms RN   (oncoming nurse). Report included the following information SBAR, Kardex, Intake/Output, MAR, Recent Results and Cardiac Rhythm SR with BBB.

## 2021-07-15 NOTE — PROGRESS NOTES
Transitions of Care Plan:  RUR: 11%  Clinical Plan: s/p TAVR; discharge today  Consults: None  Baseline: independent without DME; resides w wife  Disposition: home    Reason for Admission:  TAVR                  RUR Score:   11%                  Plan for utilizing home health:   No       PCP: First and Last name:  Sunita Isaac MD     Name of Practice:    Are you a current patient: Yes/No:    Approximate date of last visit:    Can you participate in a virtual visit with your PCP:                     Current Advanced Directive/Advance Care Plan: Full Code      Healthcare Decision Maker:  Wife - Martin Brown - p: [phone]  Click here to complete [address] including selection of the Healthcare Decision Maker Relationship (ie \"Primary\")                   Transition of Care Plan:                      Chart reviewed - patient remains independent at baseline s/p TAVR. Wife is at bedside to transport home. No CM needs identified. Chelsy Root, MPH  Care Manager l [employer]  Available via [employer]    Care Management Interventions  PCP Verified by CM: Yes  Palliative Care Criteria Met (RRAT>21 & CHF Dx)?: No  Mode of Transport at Discharge:  Other (see comment) (Wife, private car)  Transition of Care Consult (CM Consult): Discharge Planning  MyChart Signup: No  Discharge Durable Medical Equipment: No  Health Maintenance Reviewed: Yes  Physical Therapy Consult: No  Occupational Therapy Consult: No  Speech Therapy Consult: No  Current Support Network: Lives with Spouse, Own Home  Confirm Follow Up Transport: Family  Discharge Location  Discharge Placement: Home

## 2021-07-15 NOTE — CARDIO/PULMONARY
Cardiac Rehab: TAVR education folder to the bedside of Rosa Marcos. Patient is unavailable so we will follow for enrollment in cardiac rehab. Since I was unable to speak to the patient, [facility] cardiac rehab contact information placed on the AVS, however, the patient lives in [place] so he may prefer [facility] cardiac rehab.  Denny Taveras RN

## 2021-07-15 NOTE — PROGRESS NOTES
Spiritual Care Assessment/Progress Note  [facility]      NAME: Purvi Samayoa      MRN: [id number]  AGE: 79 y.o.  SEX: male  Faith Affiliation: No preference   Language: English     7/15/2021           Spiritual Assessment begun in [facility] through conversation with:         [x]Patient        [] Family    [] Friend(s)        Reason for Consult: Initial/Spiritual assessment, critical care     Spiritual beliefs: (Please include comment if needed)     [x] Identifies with a shantell tradition:         [] Supported by a shantell community:            [] Claims no spiritual orientation:           [] Seeking spiritual identity:                [] Adheres to an individual form of spirituality:           [] Not able to assess:                           Identified resources for coping:      [x] Prayer                               [] Music                  [] Guided Imagery     [x] Family/friends                 [] Pet visits     [] Devotional reading                         [] Unknown     [] Other:                                               Interventions offered during this visit: (See comments for more details)    Patient Interventions: Affirmation of emotions/emotional suffering, Catharsis/review of pertinent events in supportive environment, Initial/Spiritual assessment, Critical care, Prayer (assurance of)     Family/Friend(s): Initial Assessment, Catharsis/review of pertinent events in supportive environment, Prayer (assurance of)     Plan of Care:     [x] Support spiritual and/or cultural needs    [] Support AMD and/or advance care planning process      [] Support grieving process   [] Coordinate Rites and/or Rituals    [] Coordination with community clergy   [] No spiritual needs identified at this time   [] Detailed Plan of Care below (See Comments)  [] Make referral to Music Therapy  [] Make referral to Pet Therapy     [] Make referral to Addiction services  [] Make referral to [facility]  [] Make referral to Spiritual Care Partner  [] No future visits requested        [x] Follow up upon further referrals     Comments: Visited Mr Jolly Figueredo in [place] for initial spiritual assessment. Mr Jolly Figueredo was sitting up in a chair at the bedside and his wife was with him; they appeared in good spirits. Provided pastoral presence and active listening as Mr Jolly Figueredo shared that he felt he was doing well and hoped to be discharged today. He denied having any concerns. Assured patient and wife of ongoing  availability for support and of prayers on their behalf. : Rev. Ana Sebastian.  Raquel Siddiqi; [facility], to contact [phone] [address] call: [phone]

## 2021-07-15 NOTE — DISCHARGE SUMMARY
[facility] Discharge Summary     Patient ID:  Sofia Vaz  [id number]  79 y.o.  1950    Admit date: 7/14/2021    Discharge date: 7/15/2021     Admitting Physician: Osbaldo Leiva MD     Referring Cardiologist:  Dr. Nata Watson    PCP:  Jason Velasquez MD     Admitting Diagnoses: Aortic Stenosis    Discharge Diagnoses: AS s/p TAVR    Hospital Problems  Date Reviewed: 6/25/2021        Codes Class Noted POA    Aortic stenosis ICD-10-CM: I35.0  ICD-9-CM: 424.1  7/14/2021 Unknown        Nonrheumatic aortic valve stenosis ICD-10-CM: I35.0  ICD-9-CM: 424.1  6/23/2021 Yes              Discharged Condition: stable    Disposition: home, see patient instructions for treatment and plan    Procedures for this admission:  Procedure(s):  Transcatheter Aortic Valve Replacement 29 S3, Transthoracic Echo    Discharge Medications:      My Medications      START taking these medications      Instructions Each Dose to Equal Morning Noon Evening Bedtime   acetaminophen 325 mg tablet  Commonly known as: TYLENOL    Your last dose was: Your next dose is: Take 2 Tablets by mouth every four (4) hours as needed for Pain. 650 mg                 amLODIPine 5 mg tablet  Commonly known as: NORVASC  Start taking on: July 16, 2021    Your last dose was: Your next dose is: Take 1 Tablet by mouth daily. 5 mg                    CONTINUE taking these medications      Instructions Each Dose to Equal Morning Noon Evening Bedtime   alfuzosin SR 10 mg SR tablet  Commonly known as: UROXATRAL    Your last dose was: Your next dose is: Take 10 mg by mouth daily. 10 mg                 aspirin delayed-release 81 mg tablet    Your last dose was: Your next dose is: Take 1 Tab by mouth daily. 81 mg                 Cialis 2.5 mg tablet  Generic drug: tadalafiL    Your last dose was: Your next dose is: Take 2.5 mg by mouth daily.    2.5 mg                 fenofibric acid 135 mg capsule  Commonly known as: TRILIPIX ER    Your last dose was: Your next dose is: Take 135 mg by mouth daily. 135 mg                 FLUoxetine 20 mg tablet  Commonly known as: PROzac    Your last dose was: Your next dose is: Take 20 mg by mouth daily. 20 mg                 mirtazapine 45 mg tablet  Commonly known as: REMERON    Your last dose was: Your next dose is: Take 45 mg by mouth nightly. 45 mg                 simvastatin 40 mg tablet  Commonly known as: ZOCOR    Your last dose was: Your next dose is: Take 40 mg by mouth nightly. 40 mg                 testosterone 12.5 mg/ 1.25 gram (1 %) Glpm    Your last dose was: Your next dose is:         2 Pump(s) by TransDERmal route daily. 2 Pump(s)                    STOP taking these medications    tamsulosin 0.4 mg capsule  Commonly known as: FLOMAX              Where to Get Your Medications      These medications were sent to [facility]/pharmacy #[id number], [facility]    Phone: [phone]   · amLODIPine 5 mg tablet       HPI: Copied from H&P dated 7/12/21    Neto Soler is a 79 y.o. male  with PMHx of AS, DM, Dyslipidemia, CKD III, Depression, that is referred to the [address] [place] [address] by Dr. Marcelina Nunez interventional evaluation of  Aortic Stenosis.      He really started experiencing symptoms about a year ago. He tried to run on [employer] (he has been able to do this for short distances) and could not do it. He was significantly winded and fatigued. He has had multiple knee and shoulder surgeries which do impact his activities some but he is still able to complete his 3 mile walk daily. He is able go up about 20 stairs before getting winded. Occasionally notes palpitations. No real dizziness, no syncope or recent falls. Denies orthopnea, PND, LE edema, GIB, or HF hospitalizations in the last year.  His Flomax was recently changed to Alfuzosin.      Chest tightness which radiates to his left arm. Symptoms occur with activity and rest. Pain can last a couple of hours. He rates this between a 6-9. Associated diaphoresis-mostly at night. Denies associated N/V, dizziness, syncope.  Has gained about 5 lbs recently.     He is a retired business owner. He does not smoke or use recreational drugs. He drinks vodka daily. He is completely independent in his ADLs. He is  and his wife can help him following surgery. Hospital Course: Doni Lopez was taken to the OR on 7/14/21 for a transcatheter aortic valve replacement with 29 mm S3 via the right TF approach by Jamari Jauregui. he  had MAC anesthesia. He was monitored overnight and did well. He was able to walk in the halls with nursing; was tolerating a regular diet; and had stable VS. He was felt stable for DC to home with his family on 07/15/21 with the following assessment and plan:    1. Aortic Stenosis-s/p TAVR. Plan for RTF 29 S3 with MAC. Echo today. Dr. Joseph Peraza reviewed the EKG and states no new incomplete RBBB. ASA only.      2. DM II: Diet controlled. Followed by PCP. A1c of 6.2%     3. Moderate 1-vessel CAD with a 50% proximal LAD and 50-60% mid LAD stenosis in a right dominant coronary circulation. On ASA, Statin. Not historically on BB     4. Dyslipidemia: On Simvastatin and Tricor     5. CKD III: Creatinine this morning of 1.3-likely related to contrast/hypovolemia. Will give an albumin. Avoid hypotension and nephrotoxic agents.      6. Depression: On Prozac     7. BPH: On Alfuzosin     8. HTN: Not on antihypertensives prior to admission. Added Norvasc daily and PRN Hydralazine      Referral to outpatient cardiac rehab made.      Discharge Vital Signs:   Visit Vitals  /80 (BP 1 Location: Right upper arm, BP Patient Position: At rest)   Pulse 79   Temp 98.2 °F (36.8 °C)   Resp 24   Ht 5' 10\" (1.778 m)   Wt 215 lb 4.8 oz (97.7 kg)   SpO2 96%   BMI 30.89 kg/m²       Labs:   Recent Labs 07/15/21  0516 07/14/21  1829 07/14/21  0955 07/14/21  0944 07/14/21  0627   WBC 13.7*  --   --  7.1   < >   HGB 15.0   < >  --  14.7  --    HCT 45.5   < >  --  44.8  --      --   --  161   < >     --   --  139   < >   K 3.9   < >  --  4.3  --    BUN 22*  --   --  16   < >   CREA 1.35*  --   --  1.07   < >   *  --   --  134*   < >   GLUCPOC  --   --  126*  --    < >   INR  --   --   --  1.1  --     < > = values in this interval not displayed. Diagnostics:   CXR Results  (Last 48 hours)               07/14/21 0954  XR CHEST PORT Final result    Impression:  Low lung volumes. Transcatheter aortic valve in place. No pleural effusion or pericardial effusion. Narrative:  EXAM:  XR CHEST PORT       INDICATION:  Postop heart. COMPARISON: July 12, 2021       TECHNIQUE: AP portable upright chest view       FINDINGS: Status post transcatheter aortic valve replacement. Low lung volumes. No pleural effusion, pericardial effusion, or pneumothorax. Osseous structures   are within normal limits. Patient Instructions/Follow Up Care:  Discharge instructions were reviewed with the patient and family present. Questions were also answered at this time. Prescriptions and medications were reviewed. The patient has a follow up appointment with the Nurse Practitioner or [de-identified] Assistant on 7/19 and with Dr. Antoni Vaughan on 8/10. The patient was also instructed to follow up with his primary care physician as needed. The patient and family were encouraged to call with any questions or concerns.        Signed:  Christopher Lizama NP  7/15/2021  10:57 AM

## 2021-07-15 NOTE — PROGRESS NOTES
[facility] ICU Progress Note    Admit Date: 2021  POD:  1 Day Post-Op    Procedure:  Procedure(s):  Transcatheter Aortic Valve Replacement 29 S3, Transthoracic Echo        Subjective:   Pt seen with Dr. Chris Posey. Tmax 100.1, room air. Leg is a little sore but feels good otherwise. Objective:   Vitals:  Blood pressure 102/74, pulse 77, temperature 98.4 °F (36.9 °C), resp. rate 10, height 5' 10\" (1.778 m), weight 217 lb (98.4 kg), SpO2 93 %. Temp (24hrs), Av.8 °F (36.6 °C), Min:96.2 °F (35.7 °C), Max:100.1 °F (37.8 °C)    EKG/Rhythm:      Oxygen Therapy: Room air    CXR:   CXR Results  (Last 48 hours)               21 0954  XR CHEST PORT Final result    Impression:  Low lung volumes. Transcatheter aortic valve in place. No pleural effusion or pericardial effusion. Narrative:  EXAM:  XR CHEST PORT       INDICATION:  Postop heart. COMPARISON: 2021       TECHNIQUE: AP portable upright chest view       FINDINGS: Status post transcatheter aortic valve replacement. Low lung volumes. No pleural effusion, pericardial effusion, or pneumothorax. Osseous structures   are within normal limits. Admission Weight: Last Weight   Weight: 217 lb 4.8 oz (98.6 kg) Weight: 217 lb (98.4 kg)     Intake / Output / Drain:  Current Shift: No intake/output data recorded. Last 24 hrs.:     Intake/Output Summary (Last 24 hours) at 7/15/2021 0754  Last data filed at 7/15/2021 0000  Gross per 24 hour   Intake 2364.41 ml   Output 1500 ml   Net 864.41 ml       EXAM:  General:  No acute distress. Lungs:   Clear to auscultation bilaterally. B Groin Sites:  No erythema, drainage or swelling. Mild ecchymosis. Dressings removed. Heart:  Regular rate and rhythm, S1, S2 normal, no murmur, click, rub or gallop. Abdomen:   Soft, non-tender. Bowel sounds normal. No masses,  No organomegaly. Extremities:  No edema. PPP. Neurologic:  Gross motor and sensory apparatus intact.      Labs: Recent Labs     07/15/21  0516 21  1829 21  0955 21  0944 21  0627   WBC 13.7*  --   --  7.1   < >   HGB 15.0   < >  --  14.7  --    HCT 45.5   < >  --  44.8  --      --   --  161   < >     --   --  139   < >   K 3.9   < >  --  4.3  --    BUN 22*  --   --  16   < >   CREA 1.35*  --   --  1.07   < >   *  --   --  134*   < >   GLUCPOC  --   --  126*  --    < >   INR  --   --   --  1.1  --     < > = values in this interval not displayed. Assessment:     Active Problems:    Nonrheumatic aortic valve stenosis (2021)      Aortic stenosis (2021)         Plan/Recommendations/Medical Decision Makin. Aortic Stenosis-s/p TAVR. Plan for RTF 29 S3 with MAC. Echo today. Dr. Allen Arevalo reviewed the EKG and states no new incomplete RBBB. ASA only.      2. DM II: Diet controlled. Followed by PCP. A1c of 6.2%     3. Moderate 1-vessel CAD with a 50% proximal LAD and 50-60% mid LAD stenosis in a right dominant coronary circulation. On ASA, Statin. Not historically on BB     4. Dyslipidemia: On Simvastatin and Tricor     5. CKD III: Creatinine this morning of 1.3-likely related to contrast/hypovolemia. Will give an albumin. Avoid hypotension and nephrotoxic agents.      6. Depression: On Prozac     7. BPH: On Alfuzosin    8. HTN: Not on antihypertensives prior to admission. Added Norvasc daily and PRN Hydralazine. Dispo: Cardiac Rehab. Case Management for discharge planning. Discussed with Dr. Robert Terry later today when echo complete.      Signed By: Nila Minaya NP

## 2021-07-17 LAB
ATRIAL RATE: 59 BPM
CALCULATED P AXIS, ECG09: 5 DEGREES
CALCULATED R AXIS, ECG10: 101 DEGREES
CALCULATED T AXIS, ECG11: -31 DEGREES
DIAGNOSIS, 93000: NORMAL
P-R INTERVAL, ECG05: 172 MS
Q-T INTERVAL, ECG07: 418 MS
QRS DURATION, ECG06: 104 MS
QTC CALCULATION (BEZET), ECG08: 413 MS
VENTRICULAR RATE, ECG03: 59 BPM

## 2021-07-18 LAB
ATRIAL RATE: 83 BPM
CALCULATED P AXIS, ECG09: 1 DEGREES
CALCULATED R AXIS, ECG10: -36 DEGREES
CALCULATED T AXIS, ECG11: 135 DEGREES
DIAGNOSIS, 93000: NORMAL
P-R INTERVAL, ECG05: 136 MS
Q-T INTERVAL, ECG07: 364 MS
QRS DURATION, ECG06: 98 MS
QTC CALCULATION (BEZET), ECG08: 427 MS
VENTRICULAR RATE, ECG03: 83 BPM

## 2021-07-19 ENCOUNTER — OFFICE VISIT (OUTPATIENT)
Dept: CARDIOLOGY CLINIC | Age: 71
End: 2021-07-19
Payer: MEDICARE

## 2021-07-19 DIAGNOSIS — I35.0 NONRHEUMATIC AORTIC VALVE STENOSIS: Primary | ICD-10-CM

## 2021-07-19 DIAGNOSIS — Z95.2 S/P TAVR (TRANSCATHETER AORTIC VALVE REPLACEMENT): ICD-10-CM

## 2021-07-19 PROCEDURE — 1111F DSCHRG MED/CURRENT MED MERGE: CPT | Performed by: NURSE PRACTITIONER

## 2021-07-19 PROCEDURE — G8427 DOCREV CUR MEDS BY ELIG CLIN: HCPCS | Performed by: NURSE PRACTITIONER

## 2021-07-19 PROCEDURE — G8536 NO DOC ELDER MAL SCRN: HCPCS | Performed by: NURSE PRACTITIONER

## 2021-07-19 PROCEDURE — G9717 DOC PT DX DEP/BP F/U NT REQ: HCPCS | Performed by: NURSE PRACTITIONER

## 2021-07-19 PROCEDURE — 1101F PT FALLS ASSESS-DOCD LE1/YR: CPT | Performed by: NURSE PRACTITIONER

## 2021-07-19 PROCEDURE — 3017F COLORECTAL CA SCREEN DOC REV: CPT | Performed by: NURSE PRACTITIONER

## 2021-07-19 PROCEDURE — G8417 CALC BMI ABV UP PARAM F/U: HCPCS | Performed by: NURSE PRACTITIONER

## 2021-07-19 PROCEDURE — 99441 PR PHYS/QHP TELEPHONE EVALUATION 5-10 MIN: CPT | Performed by: NURSE PRACTITIONER

## 2021-07-19 RX ORDER — ALFUZOSIN HYDROCHLORIDE 10 MG/1
10 TABLET, EXTENDED RELEASE ORAL DAILY
COMMUNITY

## 2021-07-19 NOTE — PROGRESS NOTES
Patient: Christal Maynard   Age: 79 y.o. Patient Care Team:  Conrad Hatch MD as PCP - General (Family Medicine)  Erendira Ferreira MD (Cardiology)  Pastor Ledesma MD (Cardiothoracic Surgery)    PCP: Conrad Hatch MD    Cardiologist: Dr. Joyce Manzanares    Diagnosis/Reason for Consultation: The primary encounter diagnosis was Nonrheumatic aortic valve stenosis. A diagnosis of S/P TAVR (transcatheter aortic valve replacement) was also pertinent to this visit. Problem List:   Patient Active Problem List   Diagnosis Code    Nonrheumatic aortic valve stenosis I35.0    Dyslipidemia E78.5    BPH (benign prostatic hyperplasia) N40.0    Depression F32.9    Controlled type 2 diabetes mellitus with circulatory disorder, without long-term current use of insulin (HCC) E11.59    Stage 3 chronic kidney disease (HCC) N18.30    Aortic stenosis I35.0         HPI: 79 y.o. y.o. male  S/Ptranscatheter aortic valve replacement with 29 mm S3 via the right TF approach by Jamari Mcclelland and Enrique. There were no intra-op or post-op complications. Christal Maynard was discharged to Home on 7/15/21. He is available by phone for his initial postoperative appointment. Denies fever, chills, worsening shortness of breath or fatigue, chest pain, orthopnea, PND, dizziness, syncope or recent fall, or issues with his groin sites. Patient states he is sleeping so much better. Prior to TAVR he was only sleeping about 2-3 hours per night. Now he is sleeping all night long. He feels that he is breathing much easier. He has been walking short distances multiple times daily and looks forward to building back up to his daily 3 mile walks starting this week. He has not been checking his BP at home because he does not have a BP cuff but plans on getting one. Overall, he feels much improved following TAVR. NYHA Classification: IB   Class I (Mild): No limitation of physical activity.  Ordinary physical activity does not cause undue fatigue, palpitation, or dyspnea. Class II (Mild): Slight limitation of physical activity. Comfortable at rest, but ordinary physical activity results in fatigue, palpitation, or dyspnea. Class III (Moderate): Marked limitation of physical activity. Comfortable at rest, but less than ordinary activity causes fatigue, palpitation, or dyspnea   Class IV (Severe): Unable to carry out any physical activity without discomfort. Symptoms  of cardiac insufficiency at rest.  If any physical activity is undertaken, discomfort is increased. Angina Classification: 0   Class 0: No symptoms   Class 1: Angina with strenuous exercise   Class 2: Angina with moderate exercise   Class 3: Angina with mild exertion   Walking 1-2 level blocks at normal pace; Climbing 1 flight of stairs at normal pace  Class 4: Angina at any level of physical exertion       Past Medical History:   Diagnosis Date    Aortic stenosis     Diabetes ([address]     Diastolic heart failure (HCC)     Dyslipidemia     PVD (peripheral vascular disease) ([facility])          Past Surgical History:   Procedure Laterality Date    HX KNEE ARTHROSCOPY      HX SHOULDER ARTHROSCOPY        Social History     Tobacco Use    Smoking status: Former Smoker    Smokeless tobacco: Never Used   Substance Use Topics    Alcohol use: Yes     Alcohol/week: 1.0 standard drinks     Types: 1 Standard drinks or equivalent per week      Family History   Problem Relation Age of Onset    Dementia Mother     Alzheimer Father     Heart Attack Brother      Prior to Admission medications    Medication Sig Start Date End Date Taking? Authorizing Provider   acetaminophen (TYLENOL) 325 mg tablet Take 2 Tablets by mouth every four (4) hours as needed for Pain. 7/15/21   Arlys Daughters, NP   amLODIPine (NORVASC) 5 mg tablet Take 1 Tablet by mouth daily. 7/16/21   Arlys Daughters, NP   tadalafiL (Cialis) 2.5 mg tablet Take 2.5 mg by mouth daily.     Provider, Historical   fenofibric acid [facility] ER) 135 mg capsule Take 135 mg by mouth daily. Provider, Historical   FLUoxetine (PROzac) 20 mg tablet Take 20 mg by mouth daily. Provider, Historical   mirtazapine (REMERON) 45 mg tablet Take 45 mg by mouth nightly. Provider, Historical   simvastatin (ZOCOR) 40 mg tablet Take 40 mg by mouth nightly. Provider, Historical   testosterone 12.5 mg/ 1.25 gram (1 %) glpm 2 Pump(s) by TransDERmal route daily. Provider, Historical   aspirin delayed-release 81 mg tablet Take 1 Tab by mouth daily. 5/10/21   Vonda Aguirre MD       Allergies   Allergen Reactions    Readyprep Chg [Chlorhexidine Gluconate] Itching    Ceftin [Cefuroxime Axetil] Hives     Hives and vomiting    Ciprofloxacin Other (comments)     Light headedness       Current Medications:   Current Outpatient Medications   Medication Sig Dispense Refill    acetaminophen (TYLENOL) 325 mg tablet Take 2 Tablets by mouth every four (4) hours as needed for Pain.  amLODIPine (NORVASC) 5 mg tablet Take 1 Tablet by mouth daily. 90 Tablet 0    tadalafiL (Cialis) 2.5 mg tablet Take 2.5 mg by mouth daily.  fenofibric acid (TRILIPIX ER) 135 mg capsule Take 135 mg by mouth daily.  FLUoxetine (PROzac) 20 mg tablet Take 20 mg by mouth daily.  mirtazapine (REMERON) 45 mg tablet Take 45 mg by mouth nightly.  simvastatin (ZOCOR) 40 mg tablet Take 40 mg by mouth nightly.  testosterone 12.5 mg/ 1.25 gram (1 %) glpm 2 Pump(s) by TransDERmal route daily.  aspirin delayed-release 81 mg tablet Take 1 Tab by mouth daily. 90 Tab 4       Vitals: There were no vitals taken for this visit. Allergies: is allergic to readyprep chg [chlorhexidine gluconate], ceftin [cefuroxime axetil], and ciprofloxacin.     Review of Systems: Pertinent Positives per HPI   [x]Total of 13 systems reviewed as follows:  Constitutional: Negative fever, negative chills  Eyes:   Negative for amauroses fugax, +glasses  ENT:   Negative sore throat,oral abscess   Endocrine Negative for thyroid replacement Rx; goiter; +DM  Respiratory:  Negative chronic cough,sputum production  Cards:   Negative for palpitations, varicosities, claudication, lower extremity edema  GI:   Negative for dysphagia, bleeding, nausea, vomiting, diarrhea, and abdominal pain  Genitourinary: Negative for frequency, dysuria  Integument:  Negative for rash and pruritus  Hematologic:  Negative for easy bruising; bleeding dyscrasia   Musculoskel: Negative for muscle weakness inhibiting ambulation  Neurological:  Negative for stroke, TIA, syncope, dizziness  Behavl/Psych: Negative for feelings of anxiety, depression     Cardiovascular Testing:     EK/15/2021    Component Value Ref Range & Units Status   Ventricular Rate 83  BPM Final   Atrial Rate 83  BPM Final   P-R Interval 136  ms Final   QRS Duration 98  ms Final   Q-T Interval 364  ms Final   QTC Calculation (Bezet) 427  ms Final   Calculated P Axis 1  degrees Final   Calculated R Axis -36  degrees Final   Calculated T Axis 135  degrees Final   Diagnosis   Final   Normal sinus rhythm   RSR\"in V2   T wave abnormality, consider lateral ischemia          TTE:  7/15/21  Interpretation Summary       · LV: Estimated LVEF is 65 - 70%. Normal cavity size and systolic function (ejection fraction normal). Moderate concentric hypertrophy. Mild (grade 1) left ventricular diastolic dysfunction. · MV: Mitral valve thickening. Mitral valve leaflet calcification. · LA: Mildly dilated left atrium. · AV: Prosthetic aortic valve. Aortic valve mean gradient is 17 mmHg. Aortic valve area is 2.3 cm2. Increased gradients across aortic valve due to increased stroke volume. There is a Dumont 29 mm Mary 3 THV prosthetic aortic valve from prior TAVR procedure.  Prosthesis is normal. Prosthetic valve function is sufficient  · IVC: Moderately elevated central venous pressure (8 mmHg); IVC diameter is larger than 21 mm and collapses more than 50% with respiration. LVOTd 2.4 cm  LVOT VTI 22.5 cm  AV VTI 44.5 cm  DI 0.51  EOA 2.3 cm2  EOAi 1.04 cm2/m2  BSA 2.2 m2            Physical Exam:  No physical exam performed due to telephone encounter. Assessment/Plan:     1. Aortic Stenosis-s/p TAVR.  ASA only. EKG and Echo as above     2. DM II: Diet controlled. Followed by PCP. A1c of 6.2%     3. Moderate 1-vessel CAD with a 50% proximal LAD and 50-60% mid LAD stenosis in a right dominant coronary circulation. On ASA, Statin. Not historically on BB     4. Dyslipidemia: On Simvastatin and Tricor     5. CKD III: Creatinine on DC of 1.3-likely related to contrast/hypovolemia. Received volume. Avoid hypotension and nephrotoxic agents.      6. Depression: On Prozac     7. BPH: On Alfuzosin     8. HTN: Not on antihypertensives prior to admission. Added Norvasc daily. Patient will get a BP cuff to monitor    SBE prophylax reviewed. May begin Cardiac Rehab     F/U with primary cardiologist      Time spent:  10 minutes spent on the telephone with the patient. This time is not inclusive on time spent on note preparation, diagnostic testing review, or coordination of care. This service was provided through telehealth:  location of patient(home)  and location of provider([facility]).

## 2021-08-10 ENCOUNTER — OFFICE VISIT (OUTPATIENT)
Dept: CARDIOLOGY CLINIC | Age: 71
End: 2021-08-10
Payer: MEDICARE

## 2021-08-10 VITALS
RESPIRATION RATE: 16 BRPM | OXYGEN SATURATION: 98 % | SYSTOLIC BLOOD PRESSURE: 136 MMHG | DIASTOLIC BLOOD PRESSURE: 80 MMHG | HEART RATE: 90 BPM

## 2021-08-10 DIAGNOSIS — I35.0 NONRHEUMATIC AORTIC VALVE STENOSIS: Primary | ICD-10-CM

## 2021-08-10 DIAGNOSIS — Z95.2 S/P TAVR (TRANSCATHETER AORTIC VALVE REPLACEMENT): ICD-10-CM

## 2021-08-10 PROCEDURE — 3017F COLORECTAL CA SCREEN DOC REV: CPT | Performed by: NURSE PRACTITIONER

## 2021-08-10 PROCEDURE — 1101F PT FALLS ASSESS-DOCD LE1/YR: CPT | Performed by: NURSE PRACTITIONER

## 2021-08-10 PROCEDURE — G8427 DOCREV CUR MEDS BY ELIG CLIN: HCPCS | Performed by: NURSE PRACTITIONER

## 2021-08-10 PROCEDURE — 99214 OFFICE O/P EST MOD 30 MIN: CPT | Performed by: NURSE PRACTITIONER

## 2021-08-10 PROCEDURE — G8536 NO DOC ELDER MAL SCRN: HCPCS | Performed by: NURSE PRACTITIONER

## 2021-08-10 PROCEDURE — 1111F DSCHRG MED/CURRENT MED MERGE: CPT | Performed by: NURSE PRACTITIONER

## 2021-08-10 PROCEDURE — G8417 CALC BMI ABV UP PARAM F/U: HCPCS | Performed by: NURSE PRACTITIONER

## 2021-08-10 PROCEDURE — G9717 DOC PT DX DEP/BP F/U NT REQ: HCPCS | Performed by: NURSE PRACTITIONER

## 2021-08-10 NOTE — PROGRESS NOTES
Patient: Joselin Batres   Age: 79 y.o. Patient Care Team:  Gordon Sanhcez MD as PCP - General (Family Medicine)  Daniella Grady MD (Cardiology)  Candace Wilson MD (Cardiothoracic Surgery)    PCP: Gordon Sanchez MD    Cardiologist: Dr. Laurie Fitzgerald    Diagnosis/Reason for Consultation: The primary encounter diagnosis was Nonrheumatic aortic valve stenosis. A diagnosis of S/P TAVR (transcatheter aortic valve replacement) was also pertinent to this visit. 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 y.o. male  S/P transcatheter aortic valve replacement with 29 mm S3 via the right TF approach by Jamari Mcclelland and Enrique. There were no intra-op or post-op complications. Joselin Batres was discharged to Home on 7/15/21. he  is here today for his  One Month Post Op Appointment. He feels great and would like to go back to playing golf. He has been walking as much as he can. He had a reaction with Levaquin and his achilles but this is getting better and he has been walking 2-3 miles daily. Denies fever, chills, worsening shortness of breath or fatigue, chest pain, orthopnea, PND, dizziness, syncope or recent fall, or issues with his incisions. He has no complaints and states he feels so much better than prior to TAVR. NYHA Classification: IB   Class I (Mild): No limitation of physical activity. Ordinary physical activity does not cause undue fatigue, palpitation, or dyspnea. Class II (Mild): Slight limitation of physical activity.  Comfortable at rest, but ordinary physical activity results in fatigue, palpitation, or dyspnea. Class III (Moderate): Marked limitation of physical activity. Comfortable at rest, but less than ordinary activity causes fatigue, palpitation, or dyspnea   Class IV (Severe): Unable to carry out any physical activity without discomfort. Symptoms  of cardiac insufficiency at rest.  If any physical activity is undertaken, discomfort is increased. Angina Classification: 0   Class 0: No symptoms   Class 1: Angina with strenuous exercise   Class 2: Angina with moderate exercise   Class 3: Angina with mild exertion   Walking 1-2 level blocks at normal pace; Climbing 1 flight of stairs at normal pace  Class 4: Angina at any level of physical exertion       Past Medical History:   Diagnosis Date    Aortic stenosis     Diabetes ([facility] [address]     Diastolic heart failure (HCC)     Dyslipidemia     PVD (peripheral vascular disease) ([facility])          Past Surgical History:   Procedure Laterality Date    HX KNEE ARTHROSCOPY      HX SHOULDER ARTHROSCOPY        Social History     Tobacco Use    Smoking status: Former Smoker    Smokeless tobacco: Never Used   Substance Use Topics    Alcohol use: Yes     Alcohol/week: 1.0 standard drinks     Types: 1 Standard drinks or equivalent per week      Family History   Problem Relation Age of Onset    Dementia Mother     Alzheimer Father     Heart Attack Brother      Prior to Admission medications    Medication Sig Start Date End Date Taking? Authorizing Provider   alfuzosin SR (UROXATRAL) 10 mg SR tablet Take 10 mg by mouth daily. Yes Provider, Historical   acetaminophen (TYLENOL) 325 mg tablet Take 2 Tablets by mouth every four (4) hours as needed for Pain. 7/15/21  Yes Oscar Meza NP   amLODIPine (NORVASC) 5 mg tablet Take 1 Tablet by mouth daily. 7/16/21  Yes Oscar Meza NP   tadalafiL (Cialis) 2.5 mg tablet Take 2.5 mg by mouth daily.    Yes Provider, Historical   fenofibric acid (TRILIPIX ER) 135 mg capsule Take 135 mg by mouth daily. Yes Provider, Historical   FLUoxetine (PROzac) 20 mg tablet Take 20 mg by mouth daily. Yes Provider, Historical   mirtazapine (REMERON) 45 mg tablet Take 45 mg by mouth nightly. Yes Provider, Historical   simvastatin (ZOCOR) 40 mg tablet Take 40 mg by mouth nightly. Yes Provider, Historical   testosterone 12.5 mg/ 1.25 gram (1 %) glpm 2 Pump(s) by TransDERmal route daily. Yes Provider, Historical   aspirin delayed-release 81 mg tablet Take 1 Tab by mouth daily. 5/10/21  Yes Moraima Spear MD       Allergies   Allergen Reactions    Readyprep Chg [Chlorhexidine Gluconate] Itching    Ceftin [Cefuroxime Axetil] Hives     Hives and vomiting    Ciprofloxacin Other (comments)     Light headedness       Current Medications:   Current Outpatient Medications   Medication Sig Dispense Refill    alfuzosin SR (UROXATRAL) 10 mg SR tablet Take 10 mg by mouth daily.  acetaminophen (TYLENOL) 325 mg tablet Take 2 Tablets by mouth every four (4) hours as needed for Pain.  amLODIPine (NORVASC) 5 mg tablet Take 1 Tablet by mouth daily. 90 Tablet 0    tadalafiL (Cialis) 2.5 mg tablet Take 2.5 mg by mouth daily.  fenofibric acid (TRILIPIX ER) 135 mg capsule Take 135 mg by mouth daily.  FLUoxetine (PROzac) 20 mg tablet Take 20 mg by mouth daily.  mirtazapine (REMERON) 45 mg tablet Take 45 mg by mouth nightly.  simvastatin (ZOCOR) 40 mg tablet Take 40 mg by mouth nightly.  testosterone 12.5 mg/ 1.25 gram (1 %) glpm 2 Pump(s) by TransDERmal route daily.  aspirin delayed-release 81 mg tablet Take 1 Tab by mouth daily. 90 Tab 4       Vitals: Blood pressure 136/80, pulse 90, resp. rate 16, SpO2 98 %. Allergies: is allergic to readyprep chg [chlorhexidine gluconate], ceftin [cefuroxime axetil], and ciprofloxacin.     Review of Systems: Pertinent Positives per HPI   [x]Total of 13 systems reviewed as follows:  Constitutional: Negative fever, negative chills  Eyes:   Negative for amauroses fugax  ENT:   Negative sore throat,oral abscess   Endocrine Negative for thyroid replacement Rx; goiter; DM  Respiratory:  Negative chronic cough,sputum production  Cards:   Negative for palpitations, varicosities, claudication, lower extremity edema  GI:   Negative for dysphagia, bleeding, nausea, vomiting, diarrhea, and abdominal pain  Genitourinary: Negative for frequency, dysuria  Integument:  Negative for rash and pruritus  Hematologic:  Negative for easy bruising; bleeding dyscrasia   Musculoskel: Negative for muscle weakness inhibiting ambulation, +achilles issues  Neurological:  Negative for stroke, TIA, syncope, dizziness  Behavl/Psych: Negative for feelings of anxiety, +depression     Cardiovascular Testing:     EK/15/2021     Component Value Ref Range & Units Status   Ventricular Rate 83  BPM Final   Atrial Rate 83  BPM Final   P-R Interval 136  ms Final   QRS Duration 98  ms Final   Q-T Interval 364  ms Final   QTC Calculation (Bezet) 427  ms Final   Calculated P Axis 1  degrees Final   Calculated R Axis -36  degrees Final   Calculated T Axis 135  degrees Final   Diagnosis     Final   Normal sinus rhythm   RSR\"in V2   T wave abnormality, consider lateral ischemia             TTE:  7/15/21  Interpretation Summary        · LV: Estimated LVEF is 65 - 70%. Normal cavity size and systolic function (ejection fraction normal). Moderate concentric hypertrophy. Mild (grade 1) left ventricular diastolic dysfunction. · MV: Mitral valve thickening. Mitral valve leaflet calcification. · LA: Mildly dilated left atrium. · AV: Prosthetic aortic valve. Aortic valve mean gradient is 17 mmHg. Aortic valve area is 2.3 cm2. Increased gradients across aortic valve due to increased stroke volume. There is a Dumont 29 mm Mary 3 THV prosthetic aortic valve from prior TAVR procedure.  Prosthesis is normal. Prosthetic valve function is sufficient  · IVC: Moderately elevated central venous pressure (8 mmHg); IVC diameter is larger than 21 mm and collapses more than 50% with respiration.     LVOTd 2.4 cm  LVOT VTI 22.5 cm  AV VTI 44.5 cm  DI 0.51  EOA 2.3 cm2  EOAi 1.04 cm2/m2  BSA 2.2 m2         Physical Exam:  General: Well nourished well groomed male appearing stated age  Neuro: A&OX3. SAUCEDA. PERRL. Steady unassisted gait  Head:Normocephalic. Atraumatic. Symmetrical  Neck: Trachea Midline  Resp: CTA B. No Adv BS/cough/sputum/tachypnea with seated conversation  CV: S1S2 RRR. No appreciable murmur. No JVD/carotid bruits. Pink/warm/dry extremities. No LE peripheral edema  GI:Benign ab. Soft. NT/ND. Active BS  : Voids  Integ: No obvious s/s of infection or breakdown  Musculo/Skeletal: FROM in all major joints. Good muscle tone    Clinic Evaluation:   KCCQ-12: scanned into EMR      Assessment/Plan:     1. Aortic Stenosis-s/p TAVR.  ASA only. EKG and Echo as above     2. DM II: Diet controlled. Followed by PCP. A1c of 6.2%     3. Moderate 1-vessel CAD with a 50% proximal LAD and 50-60% mid LAD stenosis in a right dominant coronary circulation. On ASA, Statin. Not historically on BB     4. Dyslipidemia: On Simvastatin and Tricor     5. CKD III: Creatinine on DC of 1.3-likely related to contrast/hypovolemia. Received volume. Avoid hypotension and nephrotoxic agents.      6. Depression: On Prozac     7. BPH: On Alfuzosin     8. HTN: Not on antihypertensives prior to admission. Has done well on Norvasc daily. Patient has been monitoring at home and it has been 130-140/80s.        SBE prophylax reviewed.     May begin Cardiac Rehab     F/U with primary cardiologist

## 2021-08-10 NOTE — LETTER
8/10/2021    Patient: Albesa Paget   YOB: 1950   Date of Visit: 8/10/2021     Kaila Franklin, [address]  [address]  Via Fax: [phone]    Dear Kaila Franklin MD,      Thank you for referring Mr. Albesa Paget to [address] [place] [address] for evaluation. My notes for this consultation are attached. If you have questions, please do not hesitate to call me. I look forward to following your patient along with you.       Sincerely,    Carol Jonas MD

## 2022-07-11 ENCOUNTER — TELEPHONE (OUTPATIENT)
Dept: CARDIOLOGY CLINIC | Age: 72
End: 2022-07-11

## 2022-07-11 DIAGNOSIS — Z95.2 S/P TAVR (TRANSCATHETER AORTIC VALVE REPLACEMENT): ICD-10-CM

## 2022-07-11 DIAGNOSIS — I35.0 NONRHEUMATIC AORTIC VALVE STENOSIS: Primary | ICD-10-CM

## 2022-07-11 NOTE — TELEPHONE ENCOUNTER
Kenneth Goldberg needs scheduled for his 1 year TAVR follow up. I've ordered the echo. Will you please help him schedule in the next month? Thanks!

## 2022-09-09 ENCOUNTER — TELEPHONE (OUTPATIENT)
Dept: CARDIOLOGY CLINIC | Age: 72
End: 2022-09-09

## 2022-09-09 NOTE — TELEPHONE ENCOUNTER
TVT Registry 1 year follow up:    KCCQ-12 completed over the phone with patient. Forms scanned into EMR. NYHA Class: 1  Medications listed in EMR reviewed with the patient: No changes to the current regimen. Hospitalizations over the past year: None  Last TTE:9/8/22  SBE reviewed with the patient.    Clinic visit not completed due to COVID-19

## (undated) DEVICE — GUIDEWIRE VASC L150CM DIA0.035IN FLX TIP L7CM PTFE STR FIX

## (undated) DEVICE — GUIDEWIRE VASC L260CM DIA0.035IN RAD 3MM J TIP L7CM PTFE

## (undated) DEVICE — KIT HND CTRL 3 W STPCOCK ROT END 54IN PREM HI PRSS TBNG AT

## (undated) DEVICE — TUBING PRSS MON L6IN PVC M FEM CONN

## (undated) DEVICE — CATHETER DIAG 6FR L110CM INTRO 6FR BLLN DIA9MM 1CC PULM ART

## (undated) DEVICE — RADIFOCUS GLIDECATH: Brand: GLIDECATH

## (undated) DEVICE — INTENDED TO STANDARDIZE OR CAMERAS TO ALLOW FOR THE USE OF THE OR CAMERA COVER: Brand: ASPEN® O.R. CAMERA COVER

## (undated) DEVICE — ANGIOGRAPHIC CATHETER: Brand: IMPULSE™

## (undated) DEVICE — STERILE POLYISOPRENE POWDER-FREE SURGICAL GLOVES: Brand: PROTEXIS

## (undated) DEVICE — GOWN,SIRUS,FABRNF,XL,20/CS: Brand: MEDLINE

## (undated) DEVICE — DRAPE PRB US TRNSDCR 6X96IN --

## (undated) DEVICE — INTRODUCER SHTH THN WALLED 6 FRX10 CM 22 GA ANGLED RAIN SHTH

## (undated) DEVICE — PINNACLE INTRODUCER SHEATH: Brand: PINNACLE

## (undated) DEVICE — LUER-LOK 360°: Brand: CONNECTA, LUER-LOK

## (undated) DEVICE — GDWIRE COR 0.035INX260CM -- CONFIDA

## (undated) DEVICE — PACK PROCEDURE SURG HRT CATH

## (undated) DEVICE — KIT MFLD ISOLATN NACL CNTRST PRT TBNG SPIK W/ PRSS TRNSDUC

## (undated) DEVICE — GUIDEWIRE VASC L260CM DIA0.035IN TIP L3MM STD EXCHG PTFE J

## (undated) DEVICE — STIFFEN MICRO-INTRODUCER KIT: Brand: STIFFEN MICRO-INTRODUCER KIT

## (undated) DEVICE — CATH DIAG IMPLS PIG 6FRX100CM -- IMPULSE 16599-40

## (undated) DEVICE — COPILOT BLEEDBACK CONTROL VALVE: Brand: COPILOT

## (undated) DEVICE — ANGIOGRAPHY KIT

## (undated) DEVICE — KIT MED IMAG CNTRST AGNT W/ IOPAMIDOL REUSE

## (undated) DEVICE — TR BAND RADIAL ARTERY COMPRESSION DEVICE: Brand: TR BAND

## (undated) DEVICE — CATH GUID COR JR4.0S 6FR 100CM -- LAUNCHER

## (undated) DEVICE — RADIFOCUS GLIDEWIRE: Brand: GLIDEWIRE

## (undated) DEVICE — GDWIRE ANGIO SUP STF 0.035IN --

## (undated) DEVICE — PERCLOSE PROGLIDE™ SUTURE-MEDIATED CLOSURE SYSTEM: Brand: PERCLOSE PROGLIDE™

## (undated) DEVICE — SPECIAL PROCEDURE DRAPE 32" X 34": Brand: SPECIAL PROCEDURE DRAPE

## (undated) DEVICE — C-ARM: Brand: UNBRANDED

## (undated) DEVICE — SYR 50ML LR LCK 1ML GRAD NSAF --

## (undated) DEVICE — PRESSURE MONITORING SET: Brand: TRUWAVE

## (undated) DEVICE — 6 FOOT DISPOSABLE EXTENSION CABLE WITH SAFE CONNECT / SCREW-DOWN

## (undated) DEVICE — PREP SKN CHLRAPRP APL 26ML STR --

## (undated) DEVICE — STERILE POLYISOPRENE POWDER-FREE SURGICAL GLOVES WITH EMOLLIENT COATING: Brand: PROTEXIS

## (undated) DEVICE — 1000ML,PRESSURE INFUSER W/STOPCOCK: Brand: MEDLINE

## (undated) DEVICE — WRAP SURG W1.31XL1.34M CARD FOR PT 165-172CM THERMOWRP

## (undated) DEVICE — SHEATH RAIN RAD INTRO SHTH W/ BARE WIRE 10 CM 506610S

## (undated) DEVICE — CATHETER ETER ANGIO L110CM OD5FR ID046IN L75CM 038IN 145DEG CARD

## (undated) DEVICE — Device

## (undated) DEVICE — GRADUATED BOWL: Brand: DEVON

## (undated) DEVICE — REM POLYHESIVE ADULT PATIENT RETURN ELECTRODE: Brand: VALLEYLAB

## (undated) DEVICE — 3M™ TEGADERM™ TRANSPARENT FILM DRESSING FRAME STYLE, 1626W, 4 IN X 4-3/4 IN (10 CM X 12 CM), 50/CT 4CT/CASE: Brand: 3M™ TEGADERM™